# Patient Record
Sex: MALE | Race: WHITE | NOT HISPANIC OR LATINO | Employment: FULL TIME | URBAN - METROPOLITAN AREA
[De-identification: names, ages, dates, MRNs, and addresses within clinical notes are randomized per-mention and may not be internally consistent; named-entity substitution may affect disease eponyms.]

---

## 2017-01-03 ENCOUNTER — COMMUNICATION - HEALTHEAST (OUTPATIENT)
Dept: FAMILY MEDICINE | Facility: CLINIC | Age: 47
End: 2017-01-03

## 2017-01-03 DIAGNOSIS — K21.9 ESOPHAGEAL REFLUX: ICD-10-CM

## 2017-01-25 ENCOUNTER — COMMUNICATION - HEALTHEAST (OUTPATIENT)
Dept: FAMILY MEDICINE | Facility: CLINIC | Age: 47
End: 2017-01-25

## 2017-01-25 DIAGNOSIS — B00.9 HERPES SIMPLEX WITHOUT MENTION OF COMPLICATION: ICD-10-CM

## 2017-03-01 ENCOUNTER — COMMUNICATION - HEALTHEAST (OUTPATIENT)
Dept: FAMILY MEDICINE | Facility: CLINIC | Age: 47
End: 2017-03-01

## 2017-03-01 DIAGNOSIS — B00.9 HERPES SIMPLEX WITHOUT MENTION OF COMPLICATION: ICD-10-CM

## 2017-04-19 ENCOUNTER — OFFICE VISIT - HEALTHEAST (OUTPATIENT)
Dept: FAMILY MEDICINE | Facility: CLINIC | Age: 47
End: 2017-04-19

## 2017-04-19 DIAGNOSIS — B00.9 HERPES SIMPLEX WITHOUT MENTION OF COMPLICATION: ICD-10-CM

## 2017-04-19 DIAGNOSIS — Z00.00 ROUTINE GENERAL MEDICAL EXAMINATION AT A HEALTH CARE FACILITY: ICD-10-CM

## 2017-04-19 DIAGNOSIS — K21.9 GASTROESOPHAGEAL REFLUX DISEASE WITHOUT ESOPHAGITIS: ICD-10-CM

## 2017-04-19 LAB
CHOLEST SERPL-MCNC: 269 MG/DL
FASTING STATUS PATIENT QL REPORTED: YES
HDLC SERPL-MCNC: 49 MG/DL
LDLC SERPL CALC-MCNC: 193 MG/DL
TRIGL SERPL-MCNC: 137 MG/DL

## 2017-04-19 ASSESSMENT — MIFFLIN-ST. JEOR: SCORE: 1930.13

## 2017-04-21 ENCOUNTER — COMMUNICATION - HEALTHEAST (OUTPATIENT)
Dept: FAMILY MEDICINE | Facility: CLINIC | Age: 47
End: 2017-04-21

## 2017-06-14 ENCOUNTER — COMMUNICATION - HEALTHEAST (OUTPATIENT)
Dept: FAMILY MEDICINE | Facility: CLINIC | Age: 47
End: 2017-06-14

## 2017-06-14 DIAGNOSIS — K21.9 ESOPHAGEAL REFLUX: ICD-10-CM

## 2018-01-22 ENCOUNTER — COMMUNICATION - HEALTHEAST (OUTPATIENT)
Dept: FAMILY MEDICINE | Facility: CLINIC | Age: 48
End: 2018-01-22

## 2018-01-22 DIAGNOSIS — B00.9 HERPES SIMPLEX VIRUS (HSV) INFECTION: ICD-10-CM

## 2018-03-19 ENCOUNTER — COMMUNICATION - HEALTHEAST (OUTPATIENT)
Dept: FAMILY MEDICINE | Facility: CLINIC | Age: 48
End: 2018-03-19

## 2018-03-19 DIAGNOSIS — K21.9 ESOPHAGEAL REFLUX: ICD-10-CM

## 2018-05-02 ENCOUNTER — OFFICE VISIT - HEALTHEAST (OUTPATIENT)
Dept: FAMILY MEDICINE | Facility: CLINIC | Age: 48
End: 2018-05-02

## 2018-05-02 DIAGNOSIS — B00.9 HERPES SIMPLEX VIRUS (HSV) INFECTION: ICD-10-CM

## 2018-05-02 DIAGNOSIS — Z00.00 ROUTINE PHYSICAL EXAMINATION: ICD-10-CM

## 2018-05-02 DIAGNOSIS — K21.9 GASTROESOPHAGEAL REFLUX DISEASE WITHOUT ESOPHAGITIS: ICD-10-CM

## 2018-05-02 LAB
ALBUMIN SERPL-MCNC: 4.1 G/DL (ref 3.5–5)
ALP SERPL-CCNC: 90 U/L (ref 45–120)
ALT SERPL W P-5'-P-CCNC: 30 U/L (ref 0–45)
ANION GAP SERPL CALCULATED.3IONS-SCNC: 12 MMOL/L (ref 5–18)
AST SERPL W P-5'-P-CCNC: 17 U/L (ref 0–40)
BILIRUB SERPL-MCNC: 1 MG/DL (ref 0–1)
BUN SERPL-MCNC: 15 MG/DL (ref 8–22)
CALCIUM SERPL-MCNC: 9.7 MG/DL (ref 8.5–10.5)
CHLORIDE BLD-SCNC: 102 MMOL/L (ref 98–107)
CHOLEST SERPL-MCNC: 258 MG/DL
CO2 SERPL-SCNC: 26 MMOL/L (ref 22–31)
CREAT SERPL-MCNC: 0.97 MG/DL (ref 0.7–1.3)
FASTING STATUS PATIENT QL REPORTED: YES
GFR SERPL CREATININE-BSD FRML MDRD: >60 ML/MIN/1.73M2
GLUCOSE BLD-MCNC: 85 MG/DL (ref 70–125)
HDLC SERPL-MCNC: 45 MG/DL
LDLC SERPL CALC-MCNC: 146 MG/DL
POTASSIUM BLD-SCNC: 4.6 MMOL/L (ref 3.5–5)
PROT SERPL-MCNC: 7.9 G/DL (ref 6–8)
SODIUM SERPL-SCNC: 140 MMOL/L (ref 136–145)
TRIGL SERPL-MCNC: 336 MG/DL

## 2018-05-02 ASSESSMENT — MIFFLIN-ST. JEOR: SCORE: 1941.69

## 2018-06-08 ENCOUNTER — COMMUNICATION - HEALTHEAST (OUTPATIENT)
Dept: FAMILY MEDICINE | Facility: CLINIC | Age: 48
End: 2018-06-08

## 2018-06-08 DIAGNOSIS — K21.9 ESOPHAGEAL REFLUX: ICD-10-CM

## 2018-06-26 ENCOUNTER — COMMUNICATION - HEALTHEAST (OUTPATIENT)
Dept: FAMILY MEDICINE | Facility: CLINIC | Age: 48
End: 2018-06-26

## 2018-06-26 DIAGNOSIS — B00.9 HERPES SIMPLEX VIRUS (HSV) INFECTION: ICD-10-CM

## 2018-12-26 ENCOUNTER — OFFICE VISIT - HEALTHEAST (OUTPATIENT)
Dept: FAMILY MEDICINE | Facility: CLINIC | Age: 48
End: 2018-12-26

## 2018-12-26 DIAGNOSIS — J02.9 SORE THROAT: ICD-10-CM

## 2018-12-26 DIAGNOSIS — J02.0 ACUTE STREPTOCOCCAL PHARYNGITIS: ICD-10-CM

## 2018-12-26 LAB — DEPRECATED S PYO AG THROAT QL EIA: ABNORMAL

## 2019-01-02 ENCOUNTER — OFFICE VISIT - HEALTHEAST (OUTPATIENT)
Dept: FAMILY MEDICINE | Facility: CLINIC | Age: 49
End: 2019-01-02

## 2019-01-02 DIAGNOSIS — K21.9 GASTROESOPHAGEAL REFLUX DISEASE WITHOUT ESOPHAGITIS: ICD-10-CM

## 2019-01-02 DIAGNOSIS — E66.3 OVERWEIGHT: ICD-10-CM

## 2019-01-02 ASSESSMENT — MIFFLIN-ST. JEOR: SCORE: 1970.76

## 2019-01-09 ENCOUNTER — COMMUNICATION - HEALTHEAST (OUTPATIENT)
Dept: FAMILY MEDICINE | Facility: CLINIC | Age: 49
End: 2019-01-09

## 2019-01-09 DIAGNOSIS — Z12.11 SCREEN FOR COLON CANCER: ICD-10-CM

## 2019-01-23 ENCOUNTER — COMMUNICATION - HEALTHEAST (OUTPATIENT)
Dept: FAMILY MEDICINE | Facility: CLINIC | Age: 49
End: 2019-01-23

## 2019-03-27 ENCOUNTER — OFFICE VISIT - HEALTHEAST (OUTPATIENT)
Dept: FAMILY MEDICINE | Facility: CLINIC | Age: 49
End: 2019-03-27

## 2019-03-27 DIAGNOSIS — R97.20 ELEVATED PROSTATE SPECIFIC ANTIGEN (PSA): ICD-10-CM

## 2019-03-27 DIAGNOSIS — Z11.3 ROUTINE SCREENING FOR STI (SEXUALLY TRANSMITTED INFECTION): ICD-10-CM

## 2019-03-27 DIAGNOSIS — R03.0 ELEVATED BLOOD PRESSURE READING WITHOUT DIAGNOSIS OF HYPERTENSION: ICD-10-CM

## 2019-03-27 LAB
ALBUMIN SERPL-MCNC: 3.8 G/DL (ref 3.5–5)
ALBUMIN UR-MCNC: ABNORMAL MG/DL
ALP SERPL-CCNC: 79 U/L (ref 45–120)
ALT SERPL W P-5'-P-CCNC: 17 U/L (ref 0–45)
ANION GAP SERPL CALCULATED.3IONS-SCNC: 9 MMOL/L (ref 5–18)
APPEARANCE UR: CLEAR
AST SERPL W P-5'-P-CCNC: 14 U/L (ref 0–40)
BACTERIA #/AREA URNS HPF: ABNORMAL HPF
BASOPHILS # BLD AUTO: 0.1 THOU/UL (ref 0–0.2)
BASOPHILS NFR BLD AUTO: 1 % (ref 0–2)
BILIRUB SERPL-MCNC: 0.6 MG/DL (ref 0–1)
BILIRUB UR QL STRIP: NEGATIVE
BUN SERPL-MCNC: 14 MG/DL (ref 8–22)
CALCIUM SERPL-MCNC: 9.5 MG/DL (ref 8.5–10.5)
CHLORIDE BLD-SCNC: 106 MMOL/L (ref 98–107)
CHOLEST SERPL-MCNC: 197 MG/DL
CO2 SERPL-SCNC: 26 MMOL/L (ref 22–31)
COLOR UR AUTO: YELLOW
CREAT SERPL-MCNC: 0.97 MG/DL (ref 0.7–1.3)
EOSINOPHIL # BLD AUTO: 0.2 THOU/UL (ref 0–0.4)
EOSINOPHIL NFR BLD AUTO: 2 % (ref 0–6)
ERYTHROCYTE [DISTWIDTH] IN BLOOD BY AUTOMATED COUNT: 12.2 % (ref 11–14.5)
FASTING STATUS PATIENT QL REPORTED: YES
GFR SERPL CREATININE-BSD FRML MDRD: >60 ML/MIN/1.73M2
GLUCOSE BLD-MCNC: 96 MG/DL (ref 70–125)
GLUCOSE UR STRIP-MCNC: NEGATIVE MG/DL
HCT VFR BLD AUTO: 41.1 % (ref 40–54)
HDLC SERPL-MCNC: 47 MG/DL
HGB BLD-MCNC: 13.8 G/DL (ref 14–18)
HGB UR QL STRIP: NEGATIVE
HIV 1+2 AB+HIV1 P24 AG SERPL QL IA: NEGATIVE
KETONES UR STRIP-MCNC: NEGATIVE MG/DL
LDLC SERPL CALC-MCNC: 125 MG/DL
LEUKOCYTE ESTERASE UR QL STRIP: ABNORMAL
LYMPHOCYTES # BLD AUTO: 1.6 THOU/UL (ref 0.8–4.4)
LYMPHOCYTES NFR BLD AUTO: 17 % (ref 20–40)
MCH RBC QN AUTO: 29 PG (ref 27–34)
MCHC RBC AUTO-ENTMCNC: 33.6 G/DL (ref 32–36)
MCV RBC AUTO: 86 FL (ref 80–100)
MONOCYTES # BLD AUTO: 0.7 THOU/UL (ref 0–0.9)
MONOCYTES NFR BLD AUTO: 8 % (ref 2–10)
NEUTROPHILS # BLD AUTO: 6.9 THOU/UL (ref 2–7.7)
NEUTROPHILS NFR BLD AUTO: 73 % (ref 50–70)
NITRATE UR QL: NEGATIVE
PH UR STRIP: 6 [PH] (ref 5–8)
PLATELET # BLD AUTO: 263 THOU/UL (ref 140–440)
PMV BLD AUTO: 7.7 FL (ref 7–10)
POTASSIUM BLD-SCNC: 4.5 MMOL/L (ref 3.5–5)
PROT SERPL-MCNC: 7.2 G/DL (ref 6–8)
PSA SERPL-MCNC: 28.8 NG/ML (ref 0–3.5)
RBC # BLD AUTO: 4.77 MILL/UL (ref 4.4–6.2)
RBC #/AREA URNS AUTO: ABNORMAL HPF
SODIUM SERPL-SCNC: 141 MMOL/L (ref 136–145)
SP GR UR STRIP: 1.02 (ref 1–1.03)
SQUAMOUS #/AREA URNS AUTO: ABNORMAL LPF
TRIGL SERPL-MCNC: 124 MG/DL
UROBILINOGEN UR STRIP-ACNC: ABNORMAL
WBC #/AREA URNS AUTO: ABNORMAL HPF
WBC: 9.5 THOU/UL (ref 4–11)

## 2019-03-28 ENCOUNTER — COMMUNICATION - HEALTHEAST (OUTPATIENT)
Dept: FAMILY MEDICINE | Facility: CLINIC | Age: 49
End: 2019-03-28

## 2019-03-28 LAB
BACTERIA SPEC CULT: NO GROWTH
C TRACH DNA SPEC QL PROBE+SIG AMP: NEGATIVE
N GONORRHOEA DNA SPEC QL NAA+PROBE: NEGATIVE
T PALLIDUM AB SER QL: NEGATIVE

## 2019-03-29 ENCOUNTER — AMBULATORY - HEALTHEAST (OUTPATIENT)
Dept: FAMILY MEDICINE | Facility: CLINIC | Age: 49
End: 2019-03-29

## 2019-03-29 LAB
HAV IGM SERPL QL IA: NEGATIVE
HBV CORE IGM SERPL QL IA: NEGATIVE
HBV SURFACE AG SERPL QL IA: NEGATIVE
HCV AB SERPL QL IA: NEGATIVE

## 2019-04-01 ENCOUNTER — COMMUNICATION - HEALTHEAST (OUTPATIENT)
Dept: FAMILY MEDICINE | Facility: CLINIC | Age: 49
End: 2019-04-01

## 2019-04-08 ENCOUNTER — RECORDS - HEALTHEAST (OUTPATIENT)
Dept: ADMINISTRATIVE | Facility: OTHER | Age: 49
End: 2019-04-08

## 2019-04-19 ENCOUNTER — RECORDS - HEALTHEAST (OUTPATIENT)
Dept: ADMINISTRATIVE | Facility: OTHER | Age: 49
End: 2019-04-19

## 2019-05-28 ENCOUNTER — COMMUNICATION - HEALTHEAST (OUTPATIENT)
Dept: FAMILY MEDICINE | Facility: CLINIC | Age: 49
End: 2019-05-28

## 2019-05-28 DIAGNOSIS — K21.9 ESOPHAGEAL REFLUX: ICD-10-CM

## 2019-07-11 ENCOUNTER — RECORDS - HEALTHEAST (OUTPATIENT)
Dept: ADMINISTRATIVE | Facility: OTHER | Age: 49
End: 2019-07-11

## 2019-07-15 ENCOUNTER — OFFICE VISIT - HEALTHEAST (OUTPATIENT)
Dept: FAMILY MEDICINE | Facility: CLINIC | Age: 49
End: 2019-07-15

## 2019-07-15 DIAGNOSIS — K21.9 ESOPHAGEAL REFLUX: ICD-10-CM

## 2019-07-15 DIAGNOSIS — Z12.11 SPECIAL SCREENING FOR MALIGNANT NEOPLASMS, COLON: ICD-10-CM

## 2019-07-15 DIAGNOSIS — B00.9 HERPES SIMPLEX VIRUS (HSV) INFECTION: ICD-10-CM

## 2019-07-15 RX ORDER — VALACYCLOVIR HYDROCHLORIDE 1 G/1
TABLET, FILM COATED ORAL
Qty: 8 TABLET | Refills: 6 | Status: SHIPPED | OUTPATIENT
Start: 2019-07-15 | End: 2021-10-01

## 2019-07-15 ASSESSMENT — MIFFLIN-ST. JEOR: SCORE: 1944.61

## 2019-10-23 ENCOUNTER — AMBULATORY - HEALTHEAST (OUTPATIENT)
Dept: LAB | Facility: CLINIC | Age: 49
End: 2019-10-23

## 2019-10-23 LAB
ANION GAP SERPL CALCULATED.3IONS-SCNC: 8 MMOL/L (ref 5–18)
BUN SERPL-MCNC: 15 MG/DL (ref 8–22)
CALCIUM SERPL-MCNC: 9.5 MG/DL (ref 8.5–10.5)
CHLORIDE BLD-SCNC: 106 MMOL/L (ref 98–107)
CO2 SERPL-SCNC: 27 MMOL/L (ref 22–31)
CREAT SERPL-MCNC: 1.13 MG/DL (ref 0.7–1.3)
GFR SERPL CREATININE-BSD FRML MDRD: >60 ML/MIN/1.73M2
GLUCOSE BLD-MCNC: 90 MG/DL (ref 70–125)
POTASSIUM BLD-SCNC: 4.9 MMOL/L (ref 3.5–5)
SODIUM SERPL-SCNC: 141 MMOL/L (ref 136–145)

## 2019-10-24 ENCOUNTER — COMMUNICATION - HEALTHEAST (OUTPATIENT)
Dept: FAMILY MEDICINE | Facility: CLINIC | Age: 49
End: 2019-10-24

## 2019-11-08 ENCOUNTER — COMMUNICATION - HEALTHEAST (OUTPATIENT)
Dept: FAMILY MEDICINE | Facility: CLINIC | Age: 49
End: 2019-11-08

## 2019-11-11 ENCOUNTER — AMBULATORY - HEALTHEAST (OUTPATIENT)
Dept: NURSING | Facility: CLINIC | Age: 49
End: 2019-11-11

## 2019-11-11 DIAGNOSIS — Z23 NEED FOR SHINGLES VACCINE: ICD-10-CM

## 2019-11-18 ENCOUNTER — COMMUNICATION - HEALTHEAST (OUTPATIENT)
Dept: FAMILY MEDICINE | Facility: CLINIC | Age: 49
End: 2019-11-18

## 2019-11-18 DIAGNOSIS — K21.9 ESOPHAGEAL REFLUX: ICD-10-CM

## 2019-12-04 ENCOUNTER — COMMUNICATION - HEALTHEAST (OUTPATIENT)
Dept: FAMILY MEDICINE | Facility: CLINIC | Age: 49
End: 2019-12-04

## 2020-01-13 ENCOUNTER — AMBULATORY - HEALTHEAST (OUTPATIENT)
Dept: NURSING | Facility: CLINIC | Age: 50
End: 2020-01-13

## 2020-01-23 ENCOUNTER — COMMUNICATION - HEALTHEAST (OUTPATIENT)
Dept: LAB | Facility: CLINIC | Age: 50
End: 2020-01-23

## 2020-01-29 ENCOUNTER — AMBULATORY - HEALTHEAST (OUTPATIENT)
Dept: LAB | Facility: CLINIC | Age: 50
End: 2020-01-29

## 2020-01-29 LAB
ANION GAP SERPL CALCULATED.3IONS-SCNC: 9 MMOL/L (ref 5–18)
BUN SERPL-MCNC: 13 MG/DL (ref 8–22)
CALCIUM SERPL-MCNC: 9.4 MG/DL (ref 8.5–10.5)
CHLORIDE BLD-SCNC: 104 MMOL/L (ref 98–107)
CO2 SERPL-SCNC: 27 MMOL/L (ref 22–31)
CREAT SERPL-MCNC: 1.06 MG/DL (ref 0.7–1.3)
GFR SERPL CREATININE-BSD FRML MDRD: >60 ML/MIN/1.73M2
GLUCOSE BLD-MCNC: 99 MG/DL (ref 70–125)
POTASSIUM BLD-SCNC: 5 MMOL/L (ref 3.5–5)
SODIUM SERPL-SCNC: 140 MMOL/L (ref 136–145)

## 2020-01-30 ENCOUNTER — OFFICE VISIT - HEALTHEAST (OUTPATIENT)
Dept: FAMILY MEDICINE | Facility: CLINIC | Age: 50
End: 2020-01-30

## 2020-01-30 DIAGNOSIS — R03.0 ELEVATED BP WITHOUT DIAGNOSIS OF HYPERTENSION: ICD-10-CM

## 2020-01-30 DIAGNOSIS — R36.9 URETHRAL DISCHARGE: ICD-10-CM

## 2020-01-31 ENCOUNTER — COMMUNICATION - HEALTHEAST (OUTPATIENT)
Dept: FAMILY MEDICINE | Facility: CLINIC | Age: 50
End: 2020-01-31

## 2020-01-31 LAB
C TRACH DNA SPEC QL PROBE+SIG AMP: NEGATIVE
N GONORRHOEA DNA SPEC QL NAA+PROBE: POSITIVE

## 2020-02-03 ENCOUNTER — RECORDS - HEALTHEAST (OUTPATIENT)
Dept: ADMINISTRATIVE | Facility: OTHER | Age: 50
End: 2020-02-03

## 2020-02-06 ENCOUNTER — AMBULATORY - HEALTHEAST (OUTPATIENT)
Dept: NURSING | Facility: CLINIC | Age: 50
End: 2020-02-06

## 2020-02-06 DIAGNOSIS — I10 ESSENTIAL HYPERTENSION, BENIGN: ICD-10-CM

## 2020-02-17 ENCOUNTER — RECORDS - HEALTHEAST (OUTPATIENT)
Dept: ADMINISTRATIVE | Facility: OTHER | Age: 50
End: 2020-02-17

## 2020-03-03 ENCOUNTER — OFFICE VISIT - HEALTHEAST (OUTPATIENT)
Dept: FAMILY MEDICINE | Facility: CLINIC | Age: 50
End: 2020-03-03

## 2020-03-03 DIAGNOSIS — Z11.3 SCREEN FOR STD (SEXUALLY TRANSMITTED DISEASE): ICD-10-CM

## 2020-03-03 DIAGNOSIS — Z79.899 ON PRE-EXPOSURE PROPHYLAXIS FOR HIV: ICD-10-CM

## 2020-03-03 DIAGNOSIS — I10 BENIGN ESSENTIAL HYPERTENSION: ICD-10-CM

## 2020-03-03 DIAGNOSIS — Z00.00 ANNUAL PHYSICAL EXAM: ICD-10-CM

## 2020-03-03 LAB
ALBUMIN SERPL-MCNC: 4 G/DL (ref 3.5–5)
ALP SERPL-CCNC: 103 U/L (ref 45–120)
ALT SERPL W P-5'-P-CCNC: 31 U/L (ref 0–45)
ANION GAP SERPL CALCULATED.3IONS-SCNC: 12 MMOL/L (ref 5–18)
AST SERPL W P-5'-P-CCNC: 20 U/L (ref 0–40)
BILIRUB SERPL-MCNC: 0.4 MG/DL (ref 0–1)
BUN SERPL-MCNC: 19 MG/DL (ref 8–22)
CALCIUM SERPL-MCNC: 9.9 MG/DL (ref 8.5–10.5)
CHLORIDE BLD-SCNC: 105 MMOL/L (ref 98–107)
CHOLEST SERPL-MCNC: 201 MG/DL
CO2 SERPL-SCNC: 24 MMOL/L (ref 22–31)
CREAT SERPL-MCNC: 1.01 MG/DL (ref 0.7–1.3)
FASTING STATUS PATIENT QL REPORTED: YES
GFR SERPL CREATININE-BSD FRML MDRD: >60 ML/MIN/1.73M2
GLUCOSE BLD-MCNC: 102 MG/DL (ref 70–125)
HDLC SERPL-MCNC: 41 MG/DL
HIV 1+2 AB+HIV1 P24 AG SERPL QL IA: NEGATIVE
LDLC SERPL CALC-MCNC: 115 MG/DL
POTASSIUM BLD-SCNC: 4.6 MMOL/L (ref 3.5–5)
PROT SERPL-MCNC: 7.2 G/DL (ref 6–8)
PSA SERPL-MCNC: 1.6 NG/ML (ref 0–3.5)
SODIUM SERPL-SCNC: 141 MMOL/L (ref 136–145)
TRIGL SERPL-MCNC: 227 MG/DL

## 2020-03-03 ASSESSMENT — MIFFLIN-ST. JEOR: SCORE: 1954.18

## 2020-03-04 ENCOUNTER — COMMUNICATION - HEALTHEAST (OUTPATIENT)
Dept: LAB | Facility: CLINIC | Age: 50
End: 2020-03-04

## 2020-03-04 LAB
C TRACH DNA SPEC QL PROBE+SIG AMP: NEGATIVE
HCV AB SERPL QL IA: NEGATIVE
N GONORRHOEA DNA SPEC QL NAA+PROBE: NEGATIVE
T PALLIDUM AB SER QL: NEGATIVE

## 2020-03-18 ENCOUNTER — AMBULATORY - HEALTHEAST (OUTPATIENT)
Dept: LAB | Facility: CLINIC | Age: 50
End: 2020-03-18

## 2020-03-18 DIAGNOSIS — I10 BENIGN ESSENTIAL HYPERTENSION: ICD-10-CM

## 2020-03-18 LAB
ANION GAP SERPL CALCULATED.3IONS-SCNC: 10 MMOL/L (ref 5–18)
BUN SERPL-MCNC: 14 MG/DL (ref 8–22)
CALCIUM SERPL-MCNC: 9.2 MG/DL (ref 8.5–10.5)
CHLORIDE BLD-SCNC: 107 MMOL/L (ref 98–107)
CO2 SERPL-SCNC: 26 MMOL/L (ref 22–31)
CREAT SERPL-MCNC: 1.04 MG/DL (ref 0.7–1.3)
GFR SERPL CREATININE-BSD FRML MDRD: >60 ML/MIN/1.73M2
GLUCOSE BLD-MCNC: 91 MG/DL (ref 70–125)
POTASSIUM BLD-SCNC: 4.2 MMOL/L (ref 3.5–5)
SODIUM SERPL-SCNC: 143 MMOL/L (ref 136–145)

## 2020-03-19 ENCOUNTER — COMMUNICATION - HEALTHEAST (OUTPATIENT)
Dept: FAMILY MEDICINE | Facility: CLINIC | Age: 50
End: 2020-03-19

## 2020-03-30 ENCOUNTER — AMBULATORY - HEALTHEAST (OUTPATIENT)
Dept: FAMILY MEDICINE | Facility: CLINIC | Age: 50
End: 2020-03-30

## 2020-03-30 DIAGNOSIS — I10 BENIGN ESSENTIAL HYPERTENSION: ICD-10-CM

## 2020-03-30 RX ORDER — BLOOD PRESSURE TEST KIT
1 KIT MISCELLANEOUS DAILY PRN
Qty: 1 EACH | Refills: 0 | Status: SHIPPED | OUTPATIENT
Start: 2020-03-30 | End: 2021-08-03

## 2020-04-28 ENCOUNTER — COMMUNICATION - HEALTHEAST (OUTPATIENT)
Dept: FAMILY MEDICINE | Facility: CLINIC | Age: 50
End: 2020-04-28

## 2020-06-22 ENCOUNTER — COMMUNICATION - HEALTHEAST (OUTPATIENT)
Dept: FAMILY MEDICINE | Facility: CLINIC | Age: 50
End: 2020-06-22

## 2020-06-23 RX ORDER — EMTRICITABINE AND TENOFOVIR DISOPROXIL FUMARATE 200; 300 MG/1; MG/1
1 TABLET, FILM COATED ORAL DAILY
Qty: 90 TABLET | Refills: 3 | Status: SHIPPED | OUTPATIENT
Start: 2020-06-23 | End: 2021-06-15

## 2020-07-27 ENCOUNTER — COMMUNICATION - HEALTHEAST (OUTPATIENT)
Dept: FAMILY MEDICINE | Facility: CLINIC | Age: 50
End: 2020-07-27

## 2020-07-27 DIAGNOSIS — Z79.899 ON PRE-EXPOSURE PROPHYLAXIS FOR HIV: ICD-10-CM

## 2020-07-28 ENCOUNTER — COMMUNICATION - HEALTHEAST (OUTPATIENT)
Dept: ADMINISTRATIVE | Facility: CLINIC | Age: 50
End: 2020-07-28

## 2020-08-03 ENCOUNTER — OFFICE VISIT - HEALTHEAST (OUTPATIENT)
Dept: INFECTIOUS DISEASES | Facility: CLINIC | Age: 50
End: 2020-08-03

## 2020-08-03 DIAGNOSIS — Z79.899 ON PRE-EXPOSURE PROPHYLAXIS FOR HIV: ICD-10-CM

## 2020-08-04 ENCOUNTER — AMBULATORY - HEALTHEAST (OUTPATIENT)
Dept: LAB | Facility: CLINIC | Age: 50
End: 2020-08-04

## 2020-08-04 DIAGNOSIS — Z79.899 ON PRE-EXPOSURE PROPHYLAXIS FOR HIV: ICD-10-CM

## 2020-08-04 LAB
ANION GAP SERPL CALCULATED.3IONS-SCNC: 10 MMOL/L (ref 5–18)
BUN SERPL-MCNC: 17 MG/DL (ref 8–22)
CALCIUM SERPL-MCNC: 9.4 MG/DL (ref 8.5–10.5)
CHLORIDE BLD-SCNC: 106 MMOL/L (ref 98–107)
CO2 SERPL-SCNC: 25 MMOL/L (ref 22–31)
CREAT SERPL-MCNC: 1.08 MG/DL (ref 0.7–1.3)
GFR SERPL CREATININE-BSD FRML MDRD: >60 ML/MIN/1.73M2
GLUCOSE BLD-MCNC: 82 MG/DL (ref 70–125)
HIV 1+2 AB+HIV1 P24 AG SERPL QL IA: NEGATIVE
POTASSIUM BLD-SCNC: 4.7 MMOL/L (ref 3.5–5)
SODIUM SERPL-SCNC: 141 MMOL/L (ref 136–145)

## 2020-08-17 ENCOUNTER — COMMUNICATION - HEALTHEAST (OUTPATIENT)
Dept: FAMILY MEDICINE | Facility: CLINIC | Age: 50
End: 2020-08-17

## 2020-08-17 DIAGNOSIS — K21.9 ESOPHAGEAL REFLUX: ICD-10-CM

## 2020-10-19 ENCOUNTER — AMBULATORY - HEALTHEAST (OUTPATIENT)
Dept: NURSING | Facility: CLINIC | Age: 50
End: 2020-10-19

## 2020-11-05 ENCOUNTER — AMBULATORY - HEALTHEAST (OUTPATIENT)
Dept: LAB | Facility: CLINIC | Age: 50
End: 2020-11-05

## 2020-11-05 DIAGNOSIS — Z79.899 ON PRE-EXPOSURE PROPHYLAXIS FOR HIV: ICD-10-CM

## 2020-11-05 LAB
ANION GAP SERPL CALCULATED.3IONS-SCNC: 10 MMOL/L (ref 5–18)
BUN SERPL-MCNC: 17 MG/DL (ref 8–22)
CALCIUM SERPL-MCNC: 9.4 MG/DL (ref 8.5–10.5)
CHLORIDE BLD-SCNC: 106 MMOL/L (ref 98–107)
CO2 SERPL-SCNC: 25 MMOL/L (ref 22–31)
CREAT SERPL-MCNC: 1.32 MG/DL (ref 0.7–1.3)
GFR SERPL CREATININE-BSD FRML MDRD: 57 ML/MIN/1.73M2
GLUCOSE BLD-MCNC: 89 MG/DL (ref 70–125)
HIV 1+2 AB+HIV1 P24 AG SERPL QL IA: NEGATIVE
POTASSIUM BLD-SCNC: 4.6 MMOL/L (ref 3.5–5)
SODIUM SERPL-SCNC: 141 MMOL/L (ref 136–145)

## 2020-11-09 ENCOUNTER — AMBULATORY - HEALTHEAST (OUTPATIENT)
Dept: INFECTIOUS DISEASES | Facility: CLINIC | Age: 50
End: 2020-11-09

## 2020-11-09 DIAGNOSIS — Z79.899 ON PRE-EXPOSURE PROPHYLAXIS FOR HIV: ICD-10-CM

## 2020-12-01 ENCOUNTER — COMMUNICATION - HEALTHEAST (OUTPATIENT)
Dept: INFECTIOUS DISEASES | Facility: CLINIC | Age: 50
End: 2020-12-01

## 2020-12-03 ENCOUNTER — AMBULATORY - HEALTHEAST (OUTPATIENT)
Dept: LAB | Facility: CLINIC | Age: 50
End: 2020-12-03

## 2020-12-03 DIAGNOSIS — Z79.899 ON PRE-EXPOSURE PROPHYLAXIS FOR HIV: ICD-10-CM

## 2020-12-03 LAB
ANION GAP SERPL CALCULATED.3IONS-SCNC: 10 MMOL/L (ref 5–18)
BUN SERPL-MCNC: 19 MG/DL (ref 8–22)
CALCIUM SERPL-MCNC: 9.4 MG/DL (ref 8.5–10.5)
CHLORIDE BLD-SCNC: 105 MMOL/L (ref 98–107)
CO2 SERPL-SCNC: 26 MMOL/L (ref 22–31)
CREAT SERPL-MCNC: 1.01 MG/DL (ref 0.7–1.3)
GFR SERPL CREATININE-BSD FRML MDRD: >60 ML/MIN/1.73M2
GLUCOSE BLD-MCNC: 100 MG/DL (ref 70–125)
HIV 1+2 AB+HIV1 P24 AG SERPL QL IA: NEGATIVE
POTASSIUM BLD-SCNC: 5 MMOL/L (ref 3.5–5)
SODIUM SERPL-SCNC: 141 MMOL/L (ref 136–145)

## 2020-12-15 ENCOUNTER — COMMUNICATION - HEALTHEAST (OUTPATIENT)
Dept: FAMILY MEDICINE | Facility: CLINIC | Age: 50
End: 2020-12-15

## 2020-12-15 DIAGNOSIS — I10 BENIGN ESSENTIAL HYPERTENSION: ICD-10-CM

## 2021-03-03 ENCOUNTER — AMBULATORY - HEALTHEAST (OUTPATIENT)
Dept: LAB | Facility: CLINIC | Age: 51
End: 2021-03-03

## 2021-03-03 DIAGNOSIS — Z79.899 ON PRE-EXPOSURE PROPHYLAXIS FOR HIV: ICD-10-CM

## 2021-03-03 LAB
ANION GAP SERPL CALCULATED.3IONS-SCNC: 10 MMOL/L (ref 5–18)
BUN SERPL-MCNC: 13 MG/DL (ref 8–22)
CALCIUM SERPL-MCNC: 9 MG/DL (ref 8.5–10.5)
CHLORIDE BLD-SCNC: 106 MMOL/L (ref 98–107)
CO2 SERPL-SCNC: 26 MMOL/L (ref 22–31)
CREAT SERPL-MCNC: 1.09 MG/DL (ref 0.7–1.3)
GFR SERPL CREATININE-BSD FRML MDRD: >60 ML/MIN/1.73M2
GLUCOSE BLD-MCNC: 100 MG/DL (ref 70–125)
HIV 1+2 AB+HIV1 P24 AG SERPL QL IA: NEGATIVE
POTASSIUM BLD-SCNC: 5.2 MMOL/L (ref 3.5–5)
SODIUM SERPL-SCNC: 142 MMOL/L (ref 136–145)

## 2021-03-29 ENCOUNTER — COMMUNICATION - HEALTHEAST (OUTPATIENT)
Dept: FAMILY MEDICINE | Facility: CLINIC | Age: 51
End: 2021-03-29

## 2021-03-29 DIAGNOSIS — I10 BENIGN ESSENTIAL HYPERTENSION: ICD-10-CM

## 2021-03-29 RX ORDER — LISINOPRIL 10 MG/1
10 TABLET ORAL DAILY
Qty: 90 TABLET | Refills: 1 | Status: SHIPPED | OUTPATIENT
Start: 2021-03-29 | End: 2021-09-23

## 2021-04-24 ENCOUNTER — COMMUNICATION - HEALTHEAST (OUTPATIENT)
Dept: FAMILY MEDICINE | Facility: CLINIC | Age: 51
End: 2021-04-24

## 2021-05-11 ENCOUNTER — COMMUNICATION - HEALTHEAST (OUTPATIENT)
Dept: FAMILY MEDICINE | Facility: CLINIC | Age: 51
End: 2021-05-11

## 2021-05-11 DIAGNOSIS — K21.9 ESOPHAGEAL REFLUX: ICD-10-CM

## 2021-05-27 VITALS — HEART RATE: 61 BPM | SYSTOLIC BLOOD PRESSURE: 156 MMHG | DIASTOLIC BLOOD PRESSURE: 103 MMHG

## 2021-05-27 NOTE — PROGRESS NOTES
Assessment/Plan:        Diagnoses and all orders for this visit:    Routine screening for STI (sexually transmitted infection)  -     Chlamydia trachomatis & Neisseria gonorrhoeae, Amplified Detection  -     HIV Antigen/Antibody Diagnostic [place]  -     Syphilis Screen, [facility]  -     PSA, Annual Screen (Prostatic-Specific Antigen)    Elevated blood pressure reading without diagnosis of hypertension  -     Urinalysis-UC if Indicated  -     Comprehensive Metabolic Panel  -     HM1(CBC and Differential)  -     Hepatitis Acute Evaluation  -     HM1 (CBC with Diff)  -     Lipid [facility]  -     Culture, Urine   Will prescribe PrEP as long as all labs come back negative.   Home monitor of BP over the next 2 weeks, return to clinic with Blood pressure readings in 2 weeks.  Elevated prostate specific antigen (PSA)  -     Ambulatory referral to Urology  -     ciprofloxacin HCl (CIPRO) 500 MG tablet  Dispense: 28 tablet; Refill: 0   Prostate cancer vs prostatitis, will treat with cipro and refer him to urology.  Follow up as soon as possible to urology.        Subjective:    Patient ID: Jl Sky is a 49 y.o. male.    HPI Patient has complaints of urinary discharge and pain with urination for past couple of days. Over the weekend he had unprotected sex with another man. He states he has been  for a couple of years from his partner Norris who was HIV positive. In his  life; he states that their sexual life didn't include anal sex and they always used a condom. He states he is not usually someone who has frequent intercourse with other men however he still wants to consider the PrEP prophylaxis against HIV as a possibility for him to protect himself. Until this week he has felt fine. He would like to have the screening done for STIs.     Blood pressure is slightly elevated today. No previous history of elevated BP. Denies chest pain, headaches, dizziness, leg swelling, heart palpitations.    The following  portions of the patient's history were reviewed and updated as appropriate: allergies, current medications, past family history, past medical history, past social history and problem list.    Review of Systems   Constitutional: Negative.    HENT: Negative.    Eyes: Negative.    Respiratory: Negative.    Cardiovascular: Negative.    Gastrointestinal: Negative.    Musculoskeletal: Negative.    Skin: Negative.    Neurological: Negative for dizziness, light-headedness, numbness and headaches.   Psychiatric/Behavioral: Negative.              Objective:    Physical Exam  BP (!) 141/107 (Patient Site: Left Arm, Patient Position: Sitting, Cuff Size: Adult Regular)   Pulse 63   Resp 18   Wt (!) 231 lb (104.8 kg)   BMI 30.29 kg/m    General Appearance:  Alert, cooperative, no distress, appears stated age   Head:  Normocephalic, without obvious abnormality, atraumatic   Eyes:  PERRL, conjunctiva/corneas clear, EOM's intact   Ears:  Normal TM's and external ear canals, both ears   Nose: Nares normal, septum midline, mucosa normal, no drainage   Throat: Lips, mucosa, and tongue normal; teeth and gums normal   Neck: Supple, symmetrical, trachea midline, no adenopathy, thyroid: not enlarged, symmetric, no tenderness/mass/nodules   Back:   Symmetric, no curvature, ROM normal, no CVA tenderness   Lungs:   Clear to auscultation bilaterally, respirations unlabored   Chest Wall:  No tenderness or deformity   Heart:  Regular rate and rhythm, S1, S2 normal, no murmur, rub or gallop   Abdomen:   Soft, non-tender, bowel sounds active all four quadrants,  no masses, no organomegaly   Extremities: Extremities normal, atraumatic, no cyanosis or edema   Lymph nodes: Cervical normal   Neurologic: Normal,Coordinated, smooth gait, balance, rapid alternating movements, sensory functioning, and cranial nerves II-XII grossly intact. DTR's+2 bilaterally brachioradialis, knee, ankle.

## 2021-05-27 NOTE — TELEPHONE ENCOUNTER
Patient Returning Call  Reason for call:  Patient returning call.  Information relayed to patient:  Below message relayed to patient.   Patient has additional questions:  No  If YES, what are your questions/concerns:  No additional questions at this time.  Okay to leave a detailed message?: No call back needed

## 2021-05-27 NOTE — TELEPHONE ENCOUNTER
----- Message from Ashli Oconnell CNP sent at 3/28/2019  6:16 AM CDT -----  Please call patient and let him know his PSA was really elevated, he needs to follow up with urology for further evaluation. In case it is prostatitis I prescribed Ciprofloxacin twice a day in case of.

## 2021-05-27 NOTE — TELEPHONE ENCOUNTER
I will prescribe it as soon as I get the rest of his labs resulted.   Ashli    Routing Comment        Left message for patient to call back.

## 2021-05-27 NOTE — TELEPHONE ENCOUNTER
Who is calling:  Patient .  Reason for Call:  Patient states this medication is too expensive at this time, but wants to keep it on file to when he is able to get it.    emtricitabine-tenofovir, TDF, (TRUVADA) 200-300 mg per tablet 90 tablet 0 3/29/2019     Sig - Route: Take 1 tablet by mouth daily. - Oral        Date of last appointment with primary care: 3/27/2019  Okay to leave a detailed message: Yes, if needed.

## 2021-05-27 NOTE — TELEPHONE ENCOUNTER
Patient informed and he already picked up his rx.    He was also asking about the rx for PrEP.  He was under the assumption that you were going to send that also.  Please send a 3 mo supply.

## 2021-05-27 NOTE — PATIENT INSTRUCTIONS - HE
Check BP at home 5-6 Blood pressure readings 140/90    Low carb high fat diet   -Whole real foods. Avoid processed foods especially foods high in processed carbohydrates and sugars.   - 50-70 grams of carbs in a day,   -4 palm sized protein in a day,   -4 or more servings of veggies/day. Avoid starchy vegetables.    - Minimize sugars and fruits. (except berries; strawberries, blueberries, blackberries, raspberries).  -Good healthy fats; avocados, whole milk, cheese, full fat yogurt, nuts, natural nut butters, cream cheese, heavy cream.   -eat protein and healthy fats meals for breakfast and lunch; avoid carbohydrates for these meals.     Diet doctor.[email or web address] as a online resource for eating a low carbohydrate high fat diet.  Realfoodsrds.com    The Case Against Sugar by Tino Segal  Why we Get Fat by Tino Segal  The Obesity Code by Dr Mo Durbin (YouTube, book, podcast)  Fat Chance by Dr Howard Perez (book)  Sugar the Bitter Truth (youtube)

## 2021-05-29 NOTE — TELEPHONE ENCOUNTER
Former patient of Marilee & has not established care with another provider.  Please assign refill request to covering provider per Clinic standard process.      Refill Approved    Rx renewed per Medication Renewal Policy. Medication was last renewed on 6/10/18.    Shyann Quintanilla, [facility] Triage/Med Refill 5/28/2019     Requested Prescriptions   Pending Prescriptions Disp Refills     omeprazole (PRILOSEC) 20 MG capsule 90 capsule 3     Sig: Take 1 capsule (20 mg total) by mouth daily.       GI Medications Refill Protocol Passed - 5/28/2019  5:34 PM        Passed - PCP or prescribing provider visit in last 12 or next 3 months.     Last office visit with prescriber/PCP: 1/2/2019 Jagruti Hunt MD OR same dept: 3/27/2019 Ashli Oconnell CNP OR same specialty: 3/27/2019 Ashli Oconnell CNP  Last physical: 5/2/2018 Last MTM visit: Visit date not found   Next visit within 3 mo: Visit date not found  Next physical within 3 mo: Visit date not found  Prescriber OR PCP: Jagruti Hunt MD  Last diagnosis associated with med order: 1. Esophageal reflux  - omeprazole (PRILOSEC) 20 MG capsule; Take 1 capsule (20 mg total) by mouth daily.  Dispense: 90 capsule; Refill: 3    If protocol passes may refill for 12 months if within 3 months of last provider visit (or a total of 15 months).

## 2021-05-30 VITALS — WEIGHT: 225.38 LBS | BODY MASS INDEX: 29.87 KG/M2 | HEIGHT: 73 IN

## 2021-05-30 NOTE — PROGRESS NOTES
Assessment:     1. Special screening for malignant neoplasms, colon  Ambulatory referral for Colonoscopy   2. Herpes simplex virus (HSV) infection  valACYclovir (VALTREX) 1000 MG tablet   3. Esophageal reflux  omeprazole (PRILOSEC) 20 MG capsule   4. At risk for HIV due to homosexual contact  emtricitabine-tenofovir, TDF, (TRUVADA) 200-300 mg per tablet    Basic Metabolic Panel     Medication refill done.  For LA EP for HIV prophylaxis.  For future we will discuss possible anal HPV screening though I discussed with the patient I have not done this before and I may also refer him to a specialist.  He will do his own research and let me know if he is interested in pursuing.    Continue to monitor diarrhea but if persisting then he should follow-up for further work-up.    Plan:      The diagnosis was discussed with the patient and evaluation and treatment plans outlined.  Follow up: Return in about 8 months (around 3/15/2020) for Annual physical.     Subjective:      Jl Sky is a  male who presents for evaluation of   Chief Complaint   Patient presents with     Establish Care     Pt here today to establish care with a new provider- former Dr Hunt Pt     1- Diarrhea: This has been present for about a week.  He describes stools as being mushy.  No watery diarrhea.  They are getting somewhat better.    2- High BP: This was noted in the last visit.  Recheck today is normal.  No shortness of breath or chest pain    He would like to discuss LA EP for HIV prophylaxis as he is in same-sex relationship.  Currently does not have a partner.      The following portions of the patient's history were reviewed and updated as appropriate: allergies, current medications, past family history, past medical history, past social history, past surgical history and problem list.  No Known Allergies    Current Outpatient Medications on File Prior to Visit   Medication Sig Dispense Refill     loratadine (CLARITIN) 10 mg tablet  Take 10 mg by mouth daily.       No current facility-administered medications on file prior to visit.        Patient Active Problem List   Diagnosis     Overweight     Hemorrhoids     Herpes Simplex Type I     Esophageal Reflux     Allergic Rhinitis     Wheezing (Symptom)     Snoring (Symptom)       History reviewed. No pertinent past medical history.    History reviewed. No pertinent surgical history.    Family History   Problem Relation Age of Onset     Hypertension Mother      Pulmonary embolism Father      Diabetes type II Father         500 lbs       Social History     Socioeconomic History     Marital status: Single     Spouse name: None     Number of children: None     Years of education: None     Highest education level: None   Occupational History     None   Social Needs     Financial resource strain: None     Food insecurity:     Worry: None     Inability: None     Transportation needs:     Medical: None     Non-medical: None   Tobacco Use     Smoking status: Never Smoker     Smokeless tobacco: Never Used   Substance and Sexual Activity     Alcohol use: None     Drug use: None     Sexual activity: None   Lifestyle     Physical activity:     Days per week: None     Minutes per session: None     Stress: None   Relationships     Social connections:     Talks on phone: None     Gets together: None     Attends Latter day service: None     Active member of club or organization: None     Attends meetings of clubs or organizations: None     Relationship status: None     Intimate partner violence:     Fear of current or ex partner: None     Emotionally abused: None     Physically abused: None     Forced sexual activity: None   Other Topics Concern     None   Social History Narrative    Patient is single.  He is in same-sex relationship but currently not partnered.  He is a  and teaches choir and orchestra.        Samm Iraheta MD  7/17/2019                The 10-year ASCVD risk score (Luly PEREZ Jr.,  "et al., 2013) is: 3.9%      Values used to calculate the score:        Age: 49 years        Sex: Male        Is Non- : No        Diabetic: No        Tobacco smoker: No        Systolic Blood Pressure: 134 mmHg        Is BP treated: No        HDL Cholesterol: 47 mg/dL        Total Cholesterol: 197 mg/dL     Review of Systems  A 12 point comprehensive review of systems was negative except as noted.       Objective:   /90   Pulse 61   Resp 16   Ht 6' 1.58\" (1.869 m)   Wt (!) 227 lb 6.4 oz (103.1 kg)   SpO2 98%   BMI 29.53 kg/m      GENERAL APPEARANCE:  Appearing stated age, smiling, alert, cooperative, and in no acute distress.   HEENT: Pupils equal, regular, react to light and accommodation. Extraocular muscles intact, fundi benign. Ear canals and tympanic membranes are normal. Lips, mouth, and throat are unremarkable.   NECK: Neck supple without adenopathy, thyromegaly or masses.   LYMPH: No anterior cervical or supraclavicular LN enlargement   PULMONARY: Normal respiratory effort. Chest is clear.   CARDIOVASCULAR: Heart auscultation: rhythm regular, heart sounds normal S1 and S2, bruit carotid artery absent.   SKIN: Warm and well perfused..   ABDOMEN: Abdomen soft, non-tender. BS normal. No masses or organomegaly.   EXTREMITIES: Peripheral pulses are full. Extremities with no edema.   MENTAL STATUS: Alert, oriented and thought content appropriate   NEUROLOGIC: Station and gait normal, strength and movement normal, reflexes are normal and symmetric       "

## 2021-06-01 VITALS — BODY MASS INDEX: 30.22 KG/M2 | WEIGHT: 228 LBS | HEIGHT: 73 IN

## 2021-06-02 VITALS — BODY MASS INDEX: 30.34 KG/M2 | WEIGHT: 231.4 LBS

## 2021-06-02 VITALS — HEIGHT: 73 IN | WEIGHT: 234.4 LBS | BODY MASS INDEX: 31.07 KG/M2

## 2021-06-02 VITALS — BODY MASS INDEX: 30.29 KG/M2 | WEIGHT: 231 LBS

## 2021-06-03 VITALS — BODY MASS INDEX: 29.18 KG/M2 | HEIGHT: 74 IN | WEIGHT: 227.4 LBS

## 2021-06-03 NOTE — TELEPHONE ENCOUNTER
Refill Approved    Rx renewed per Medication Renewal Policy. Medication was last renewed on 7/15/19.    Shyann Quintanilla, [facility] Triage/Med Refill 11/19/2019     Requested Prescriptions   Pending Prescriptions Disp Refills     omeprazole (PRILOSEC) 20 MG capsule 90 capsule 0     Sig: Take 1 capsule (20 mg total) by mouth daily.       GI Medications Refill Protocol Passed - 11/18/2019 10:45 PM        Passed - PCP or prescribing provider visit in last 12 or next 3 months.     Last office visit with prescriber/PCP: 7/15/2019 Samm Iraheta MD OR same dept: 7/15/2019 Samm Iraheta MD OR same specialty: 7/15/2019 Samm Iraheta MD  Last physical: Visit date not found Last MTM visit: Visit date not found   Next visit within 3 mo: Visit date not found  Next physical within 3 mo: Visit date not found  Prescriber OR PCP: Samm Iraheta MD  Last diagnosis associated with med order: 1. Esophageal reflux  - omeprazole (PRILOSEC) 20 MG capsule; Take 1 capsule (20 mg total) by mouth daily.  Dispense: 90 capsule; Refill: 0    If protocol passes may refill for 12 months if within 3 months of last provider visit (or a total of 15 months).

## 2021-06-04 VITALS
WEIGHT: 228 LBS | RESPIRATION RATE: 20 BRPM | BODY MASS INDEX: 29.61 KG/M2 | DIASTOLIC BLOOD PRESSURE: 108 MMHG | SYSTOLIC BLOOD PRESSURE: 142 MMHG | HEART RATE: 61 BPM | TEMPERATURE: 97.4 F

## 2021-06-04 VITALS
BODY MASS INDEX: 29.49 KG/M2 | OXYGEN SATURATION: 96 % | DIASTOLIC BLOOD PRESSURE: 109 MMHG | HEIGHT: 74 IN | WEIGHT: 229.8 LBS | HEART RATE: 65 BPM | SYSTOLIC BLOOD PRESSURE: 165 MMHG

## 2021-06-05 NOTE — PROGRESS NOTES
Follow Up Blood Pressure Check    Jl Sky is a 49 y.o. male recommended to follow up for blood pressure check by Samm Iraheta MD. Anihypertensive medications and adherence were verified: Yes.     Reason for visit: Elevated bp at last OV    Medication change at last visit: none    Today's Vitals:   Vitals:    02/06/20 0853 02/06/20 0902   BP: (!) 146/106 (!) 156/103   Patient Site: Left Arm Left Arm   Patient Position: Sitting Sitting   Cuff Size: Adult Large Adult Large   Pulse: 60 61           Lowest blood pressure today is 146/106 and they deny signs or symptoms of new onset: None.  Please inform patient of his/her blood pressure today.  If they are asymptomatic, the patient is to continue current medications.  This message will be routed to their provider, and they will be notified if a change in medication is recommended.         Elva Morales    Current Outpatient Medications   Medication Sig Dispense Refill     emtricitabine-tenofovir, TDF, (TRUVADA) 200-300 mg per tablet Take 1 tablet by mouth daily. 90 tablet 3     loratadine (CLARITIN) 10 mg tablet Take 10 mg by mouth daily.       omeprazole (PRILOSEC) 20 MG capsule Take 1 capsule (20 mg total) by mouth daily. 90 capsule 2     valACYclovir (VALTREX) 1000 MG tablet Take 2 tablets by mouth twice daily for 1 day as needed. 8 tablet 6     No current facility-administered medications for this visit.

## 2021-06-05 NOTE — TELEPHONE ENCOUNTER
Test for gonorrhea done on 1-29-20 is POSITIVE.  You ave been appropriately treated.  The [place] [facility] will be notified and they are responsible for tracing down contacts.

## 2021-06-05 NOTE — PATIENT INSTRUCTIONS - HE
GC and Chlamydia screen    Ceftriaxone 250 mg IM times one dose    Safe sex    Zithromax 1 gram orally (single dose)    Nurse BP check in one week    Avoid salt

## 2021-06-05 NOTE — TELEPHONE ENCOUNTER
From up-to-date: Recommendation for monitoring parameters for Truvada    HIV-1 preexposure prophylaxis (PrEP) (CDC 2011; CDC 2012): Pregnancy test for women receiving PrEP (every visit); documented negative HIV test (immediately prior to use, every 2-3 months, and following discontinuation of PrEP), assess risk behaviors and symptoms of sexually-transmitted infections (STIs) or acute HIV-1 infection and provide condoms (immediately prior to use, then every 2-3 months during therapy); BUN and serum creatinine (prior to initiation, 3 months after initiation, then every 6 months); urine glucose and urine protein (in patients at risk for renal impairment or who experienced renal impairment while taking adefovir); serum phosphorus in patients with chronic kidney disease, testing for HBV (prior to initiation) and STIs (prior to initiation, then at least every 6 months, even if asymptomatic)

## 2021-06-05 NOTE — TELEPHONE ENCOUNTER
Patient gets a creatinine checked due to the recent being on a medication called Frantz Iraheta MD  1/23/2020

## 2021-06-05 NOTE — PROGRESS NOTES
"DIAGNOSIS:  1. Urethral discharge, probable gonorrhea Chlamydia trachomatis & Neisseria gonorrhoeae, Amplified Detection    cefTRIAXone injection 250 mg (ROCEPHIN)    azithromycin (ZITHROMAX Z-JOSLYN) 250 MG tablet   2. Elevated BP without diagnosis of hypertension , probable untreated HTN        PLAN:     GC and Chlamydia screen    Ceftriaxone 250 mg IM times one dose    Zithromax 1 gram orally (single dose)    Nurse BP  Check in one week    Avoid salt    Explained that [place] [facility] would attempt to attempt to contact his partner for treatment if the tests done today confirmed an STD.    Discussed \"safe sex\"          HPI:   Thinks he has an STD.  Onset 2 days ago of a greenish penile drainage which has persisted.  Some green discharge in the shorts.  Not painful to void.  No fever or chills.     Normally uses condoms but had anal sex with a male 6 days ago.  He has been unable to contact the sexual partner since.  No known drug allergies..          Current Outpatient Medications on File Prior to Visit   Medication Sig Dispense Refill     emtricitabine-tenofovir, TDF, (TRUVADA) 200-300 mg per tablet Take 1 tablet by mouth daily. 90 tablet 3     omeprazole (PRILOSEC) 20 MG capsule Take 1 capsule (20 mg total) by mouth daily. 90 capsule 2     valACYclovir (VALTREX) 1000 MG tablet Take 2 tablets by mouth twice daily for 1 day as needed. 8 tablet 6     loratadine (CLARITIN) 10 mg tablet Take 10 mg by mouth daily.       No current facility-administered medications on file prior to visit.        Pmh: reviewed  Psh: reviewed  Allergy:  reviewed      EXAM:    BP (!) 142/108   Pulse 61   Temp 97.4  F (36.3  C) (Oral)   Resp 20   Wt (!) 228 lb (103.4 kg)   BMI 29.61 kg/m    GEN:   ALERT, NAD, ORIENTED TIMES THRww  LUNGS: CTA  COR: RRR WITHOUT MURMUR  SKIN:  NO GENITAL RASH OR ULCERS NOTED  :  THICK YELLOWISH GREEN MEATAL DISCHARGE.  EXT: WITHOUT EDEMA/SWELLING    Recent Results (from the past 168 hour(s))   Basic " Metabolic Panel    Collection Time: 01/29/20  7:49 AM   Result Value Ref Range    Sodium 140 136 - 145 mmol/L    Potassium 5.0 3.5 - 5.0 mmol/L    Chloride 104 98 - 107 mmol/L    CO2 27 22 - 31 mmol/L    Anion Gap, Calculation 9 5 - 18 mmol/L    Glucose 99 70 - 125 mg/dL    Calcium 9.4 8.5 - 10.5 mg/dL    BUN 13 8 - 22 mg/dL    Creatinine 1.06 0.70 - 1.30 mg/dL    GFR MDRD Af Amer >60 >60 mL/min/1.73m2    GFR MDRD Non Af Amer >60 >60 mL/min/1.73m2

## 2021-06-06 NOTE — PROGRESS NOTES
Assessment:     1. Annual physical exam  Comprehensive Metabolic Panel    Lipid [place]    PSA, Annual Screen (Prostatic-Specific Antigen)   2. Screen for STD (sexually transmitted disease)  HIV Antigen/Antibody Diagnostic [place]    Hepatitis C Antibody (Anti-HCV)    Chlamydia trachomatis & Neisseria gonorrhoeae, Amplified Detection    Syphilis Screen, [place]   3. Benign essential hypertension  lisinopriL (PRINIVIL,ZESTRIL) 10 MG tablet   4. On pre-exposure prophylaxis for HIV        Healthy male exam.    On HIV prophylaxis with Truvada that has been dispensed by me.  Will check labs as above.  He does remain high risk due to recent gonorrhea.  I discussed with him that he does need annual screening Pap smear/HPV.  I have no expertise in doing this in primary care.  I will see if ID will see him versus [place] [facility] and will refer him.  Emphasized the importance of this per guidelines.    Noting elevated blood pressure, discussed that he has high blood pressure/hypertension and we should start medication.  Patient is agreeable.  He will come for a creatinine check in about 2 weeks and further follow-up with me in 1 month to see if we need to titrate again.    Repeat blood pressure check in clinic had remained nearly the same.      Plan:       All questions answered.  Diagnosis explained in detail, including differential.  Follow up: Return in about 4 weeks (around 3/31/2020) for recheck ( come for lab 1-2 week).     Subjective:     Chief Complaint   Patient presents with     Annual Exam     Fasting today,  no questions or concerns no refills      Establish Care        Jl Sky is a 50 y.o. male who presents for an annual exam. The patient reports that there is not domestic violence in his life.   Patient is in same-sex relationship and recently had gonorrhea.  He is on [place] EP for HIV.    Healthy Habits:   Regular Exercise: Yes and No  Sunscreen Use: Yes  Healthy Diet: Yes  Dental Visits Regularly:  Yes  Seat Belt: Yes  Sexually active: Yes  Monthly Self Testicular Exams:  Yes  Hemoccults: No  Flex Sig: No  Colonoscopy: Yes  Lipid Profile: Yes  Glucose Screen: Yes  Prevention of Osteoporosis: N/A  Last Dexa: N/A  Guns at Home:  No      Immunization History   Administered Date(s) Administered     DTP 08/01/1974     Hep A, Adult IM (19yr & older) 05/07/1996, 06/24/1997     Hep A, historic 05/07/1996, 06/24/1997     Hep B, Adult 05/19/1998, 07/01/1998, 11/03/2011     Hep B, historic 05/19/1998, 07/01/1998, 11/03/2011     INFLUENZA,RECOMBINANT,INJ,PF QUADRIVALENT 18+YRS 11/11/2019     IPV 08/01/1974     Influenza, seasonal,quad inj 6-35 mos 11/09/2011, 01/07/2013, 01/17/2014, 10/03/2014     Influenza,W3H5-39,Pandemic,split,IM 03/17/2010     Influenza,seasonal, Inj IIV3 11/09/2011, 01/07/2013, 01/17/2014, 10/03/2014     Influenza,seasonal,quad inj =/> 6months 01/02/2019     MMR 08/01/1974     Tdap 01/07/2013     ZOSTER, RECOMBINANT, IM 11/11/2019, 01/13/2020     Immunization status: up to date and documented.    No exam data present    Current Outpatient Medications   Medication Sig Dispense Refill     emtricitabine-tenofovir, TDF, (TRUVADA) 200-300 mg per tablet Take 1 tablet by mouth daily. 90 tablet 3     omeprazole (PRILOSEC) 20 MG capsule Take 1 capsule (20 mg total) by mouth daily. 90 capsule 2     lisinopriL (PRINIVIL,ZESTRIL) 10 MG tablet Take 1 tablet (10 mg total) by mouth daily. 30 tablet 11     valACYclovir (VALTREX) 1000 MG tablet Take 2 tablets by mouth twice daily for 1 day as needed. 8 tablet 6     No current facility-administered medications for this visit.      No past medical history on file.  No past surgical history on file.  Patient has no known allergies.  Family History   Problem Relation Age of Onset     Hypertension Mother      Pulmonary embolism Father      Diabetes type II Father         500 lbs     Social History     Socioeconomic History     Marital status: Single     Spouse name: Not  on file     Number of children: Not on file     Years of education: Not on file     Highest education level: Not on file   Occupational History     Not on file   Social Needs     Financial resource strain: Not on file     Food insecurity:     Worry: Not on file     Inability: Not on file     Transportation needs:     Medical: Not on file     Non-medical: Not on file   Tobacco Use     Smoking status: Never Smoker     Smokeless tobacco: Never Used   Substance and Sexual Activity     Alcohol use: Yes     Frequency: 2-3 times a week     Drinks per session: 1 or 2     Binge frequency: Never     Drug use: Never     Sexual activity: Yes     Partners: Male   Lifestyle     Physical activity:     Days per week: Not on file     Minutes per session: Not on file     Stress: Not on file   Relationships     Social connections:     Talks on phone: Not on file     Gets together: Not on file     Attends Mandaeism service: Not on file     Active member of club or organization: Not on file     Attends meetings of clubs or organizations: Not on file     Relationship status: Not on file     Intimate partner violence:     Fear of current or ex partner: Not on file     Emotionally abused: Not on file     Physically abused: Not on file     Forced sexual activity: Not on file   Other Topics Concern     Not on file   Social History Narrative    Patient is single.  He is in same-sex relationship but currently not partnered.  He is a  and teaches choir and orchestra.        Samm Iraheta MD  7/17/2019                The 10-year ASCVD risk score (Radcliffetaylor PEREZ Jr., et al., 2013) is: 3.9%      Values used to calculate the score:        Age: 49 years        Sex: Male        Is Non- : No        Diabetic: No        Tobacco smoker: No        Systolic Blood Pressure: 134 mmHg        Is BP treated: No        HDL Cholesterol: 47 mg/dL        Total Cholesterol: 197 mg/dL       Review of Systems  General:  Denies  "problem  Eyes: Denies problem  Ears/Nose/Throat: Denies problem  Cardiovascular: Denies problem  Respiratory:  Denies problem  Gastrointestinal:  Denies problem  Genitourinary: Denies problem  Musculoskeletal:  Denies problem  Skin: Denies problem  Neurologic: Denies problem  Psychiatric: Denies problem  Endocrine: Denies problem  Heme/Lymphatic: Denies problem   Allergic/Immunologic: Denies problem        Objective:     Vitals:    03/03/20 0759 03/03/20 0855   BP: (!) 177/114 (!) 165/109   Pulse: 62 65   SpO2: 96%    Weight: (!) 229 lb 12.8 oz (104.2 kg)    Height: 6' 1.5\" (1.867 m)      Body mass index is 29.91 kg/m .    Physical  General Appearance: Alert, cooperative, no distress, appears stated age  Head: Normocephalic, without obvious abnormality, atraumatic  Throat: Lips, mucosa, and tongue normal; teeth and gums normal  Neck: Supple, symmetrical, trachea midline, no adenopathy;  thyroid: not enlarged, symmetric, no tenderness/mass/nodules; no carotid bruit or JVD  Lungs: Clear to auscultation bilaterally, respirations unlabored  Heart: Regular rate and rhythm, S1 and S2 normal, no murmur, rub, or gallop,   Extremities: Extremities normal, atraumatic, no cyanosis or edema  Skin: Skin color, texture, turgor normal, no rashes or lesions  Lymph nodes: Cervical, supraclavicular,nodes normal  Neurologic: He is alert. He has normal reflexes.   Psychiatric: He has a normal mood and affect.              "

## 2021-06-06 NOTE — PROGRESS NOTES
Please inform patient that his blood pressure was elevated.  He needs follow-up in clinic for blood pressure management.    Samm Iraheta MD  2/10/2020

## 2021-06-07 ENCOUNTER — COMMUNICATION - HEALTHEAST (OUTPATIENT)
Dept: INFECTIOUS DISEASES | Facility: CLINIC | Age: 51
End: 2021-06-07

## 2021-06-07 DIAGNOSIS — Z79.899 ON PRE-EXPOSURE PROPHYLAXIS FOR HIV: ICD-10-CM

## 2021-06-07 NOTE — TELEPHONE ENCOUNTER
Please inform patient that creatinine is already been done on 3/18/2020 and it is normal.  Patient can review this in my chart.  Samm Iraheta MD  4/28/2020

## 2021-06-07 NOTE — TELEPHONE ENCOUNTER
Orders being requested: The patient is requesting a creatinine lab test. The patient states he was instructed to return for a follow up on labs since starting Truvada July 2019.   Reason service is needed/diagnosis: Follow up for treatment with Truvada.   When are orders needed by: The patient is requesting to be notified when and if the labs are recommended. Please call the patient.  Where to send Orders: [facility]  Okay to leave detailed message?  Yes

## 2021-06-07 NOTE — TELEPHONE ENCOUNTER
Left message to call back for: Message below  Information to relay to patient:  MD's message below

## 2021-06-07 NOTE — TELEPHONE ENCOUNTER
Patient Returning Call  Reason for call:  Patient returning call  Information relayed to patient:  Message below  Patient has additional questions:  No  If YES, what are your questions/concerns:  n/a  Okay to leave a detailed message?: No call back needed

## 2021-06-09 NOTE — TELEPHONE ENCOUNTER
RN cannot approve Refill Request    RN can NOT refill this medication med is not covered by policy/route to provider     . Last office visit: 7/15/2019 Samm Iraheta MD Last Physical: 3/3/2020 Last MTM visit: Visit date not found Last visit same specialty: 1/30/2020 Jl Ventura MD.  Next visit within 3 mo: Visit date not found  Next physical within 3 mo: Visit date not found      Shyann Quintanilla, [facility] Connection Triage/Med Refill 6/23/2020    Requested Prescriptions   Pending Prescriptions Disp Refills     emtricitabine-tenofovir, TDF, (TRUVADA) 200-300 mg per tablet 90 tablet 3     Sig: Take 1 tablet by mouth daily.       There is no refill protocol information for this order

## 2021-06-10 NOTE — TELEPHONE ENCOUNTER
Refill Approved    Rx renewed per Medication Renewal Policy. Medication was last renewed on 11/19/19.    hSyann Quintanilla, [facility] Connection Triage/Med Refill 8/18/2020     Requested Prescriptions   Pending Prescriptions Disp Refills     omeprazole (PRILOSEC) 20 MG capsule 90 capsule 2     Sig: Take 1 capsule (20 mg total) by mouth.       GI Medications Refill Protocol Passed - 8/17/2020  3:11 PM        Passed - PCP or prescribing provider visit in last 12 or next 3 months.     Last office visit with prescriber/PCP: 7/15/2019 Samm Iraheta MD OR same dept: 1/30/2020 Jl Ventura MD OR same specialty: 1/30/2020 Jl Ventura MD  Last physical: 3/3/2020 Last MTM visit: Visit date not found   Next visit within 3 mo: Visit date not found  Next physical within 3 mo: Visit date not found  Prescriber OR PCP: Samm Iraheta MD  Last diagnosis associated with med order: 1. Esophageal reflux  - omeprazole (PRILOSEC) 20 MG capsule; Take 1 capsule (20 mg total) by mouth.  Dispense: 90 capsule; Refill: 2    If protocol passes may refill for 12 months if within 3 months of last provider visit (or a total of 15 months).

## 2021-06-10 NOTE — TELEPHONE ENCOUNTER
Please advise on when and how to schedule.    Procedure: Ambulatory referral to Infectious Disease Status: Needs Scheduling   Requested appt date:   Authorizing: Samm Iraheta MD in [facility] FAMILY MEDICINE/OB   Referral: [id number] (Not Needed)       Diagnosis: On pre-exposure prophylaxis for HIV [Z79.899

## 2021-06-10 NOTE — PROGRESS NOTES
"Jl Sky is a 50 y.o. male who is being evaluated via a billable video visit.      The patient has been notified of following:     \"This video visit will be conducted via a call between you and your physician/provider. We have found that certain health care needs can be provided without the need for an in-person physical exam.  This service lets us provide the care you need with a video conversation.  If a prescription is necessary we can send it directly to your pharmacy.  If lab work is needed we can place an order for that and you can then stop by our lab to have the test done at a later time.    Video visits are billed at different rates depending on your insurance coverage. Please reach out to your insurance provider with any questions.    If during the course of the call the physician/provider feels a video visit is not appropriate, you will not be charged for this service.\"    Patient has given verbal consent to a Video visit? Yes  How would you like to obtain your AVS? AVS Preference: MyChart.  If dropped by the video visit, the video invitation should be sent to: [phone]  Will anyone else be joining your video visit? No      Video Start Time: 8:45 am    Additional provider notes: Visit for preexposure prophylaxis for HIV    Patient is a 50-year-old gentleman referred by Samm Iraheta MD for HIV preexposure prophylaxis counseling     Patient is currently not in a monogamous relationship.  He was started on Truvada about a year ago, which he is tolerating well.    He was diagnosed with gonorrhea in January.  He was started on lisinopril in March.    We discussed the risks and benefits of true beta including that it protects against Truvada susceptible strains of HIV which constitutes most of the strain circulating, but not all of them.    We discussed that condoms are better protection against other sexually transmitted infections and Truvada does not protect against any other sexually " transmitted infections.    There was also some question about anal Pap smears, which are indicated for men having sex with men.  I did make referral to colorectal surgery for those Pap smears.      GENERAL: Healthy, alert and no distress  EYES: Eyes grossly normal to inspection. No discharge or erythema, or obvious scleral/conjunctival abnormalities.  RESP: No audible wheeze, cough, or visible cyanosis.  No visible retractions or increased work of breathing.    NEURO: Cranial nerves grossly intact. Mentation and speech appropriate for age.  PSYCH: Mentation appears normal, affect normal/bright, judgement and insight intact, normal speech and appearance well-groomed    Impression: Sexually active man who has sex with other men.    Recent infection of gonorrhea.    Hypertension, recently started on lisinopril.    Recommendations: Garima is still appropriate.  We discussed the benefit of taking it every day as opposed to taking it prior to sexual activity.    Orders Placed This Encounter   Procedures     Basic metabolic panel     Standing Status:   Standing     Number of Occurrences:   4     Standing Expiration Date:   8/3/2021     HIV Antigen/Antibody Screening [place]     Standing Status:   Standing     Number of Occurrences:   4     Standing Expiration Date:   8/3/2021     Ambulatory referral to Colorectal Surgery     Referral Priority:   Routine     Referral Type:   Surgical     Referral Reason:   Evaluation and Treatment     Requested Specialty:   Colon and Rectal Surgery     Number of Visits Requested:   1     Follow-up as needed.    Video-Visit Details    Type of service:  Video Visit    Video End Time (time video stopped): 9:00 am  Originating Location (pt. Location): Home    Distant Location (provider location):   [facility]     Platform used for Video Visit: Mindy Evans MD

## 2021-06-10 NOTE — TELEPHONE ENCOUNTER
Yes we do manage pt's on PrEp. Pt will need to see GI for HPV testing or if lesion Dermatology. We will schedule for HIV pre-exposure f/u.

## 2021-06-10 NOTE — TELEPHONE ENCOUNTER
Please ask infectious disease if they do preexposure prophylaxis for HIV patients.  Otherwise, do we refer them to [place] infectious disease?    This patient is on [place] EP for HIV.  I do not manage that.  In the past I have referred my patients to either HIV clinic/infectious disease clinic.    Samm Iraheta MD  7/28/2020

## 2021-06-10 NOTE — TELEPHONE ENCOUNTER
Date: 8/3/2020 Status: [facility]   Time: 8:40 AM Length: 40   Visit Type: VIDEO VISIT NEW [2862] Copay: $0.00   Provider: Norris Evans MD

## 2021-06-10 NOTE — TELEPHONE ENCOUNTER
Please inform patient that he is due for lab work in September for Truvada.  (Every 6 months)    Per previous discussion, this should be managed by infectious disease specialist.    A referral is being done..  .  Samm Iraheta MD  7/27/2020        Jl,     Your lab is normal.     I would suggest that you follow-up with an infectious disease specialist for future refill of Truvada.  This is noting that further cares may be needed including anal cancer screening, HPV etc.     Let me know if you would like a referral.     Samm Iraheta MD  1/29/2020

## 2021-06-10 NOTE — PROGRESS NOTES
Assessment:      Healthy male exam.    1. Routine general medical examination at a health care facility  Immunizations reviewed and updated .  Alcohol use, safety and moderation discussed.  Recommended adequate calcium intake/osteoporosis prevention.  Discussed colon cancer screening at age 50, 45 if -American.  Diet and exercise reviewed, including goal of aerobic exercise 30-90 minutes most days of the week, moderation of portions sizes, avoiding eating out and fast food and increase in fruits and vegetables.  Discussed safe sex practices.  Discussed sun protection.  Discussed weight management.    - Lipid [place] FASTING    2. Herpes Simplex Type I  Patient with oral herpes. Uses cycle of ear as needed for outbreaks. Recently has been having more outbreaks most likely due to stress. If patient continues to get them could consider suppressive therapy  - valACYclovir (VALTREX) 1000 MG tablet; TAKE 2 TABLETS BY MOUTH TWICE DAILY FOR 1 DAY AS NEEDED  Dispense: 8 tablet; Refill: 3    3. Gastroesophageal reflux disease without esophagitis  Patient with a history of reflux. He continues on the omeprazole. He is not tried to wean off we talk about a slow wean about every other day every 3rd day and then as needed. He will attempt to do that this year         Plan:       Diagnosis explained in detail, including differential.  Discussed healthy lifestyle modifications.    I have had an Advance Directives discussion with the patient.  The following high BMI interventions were performed this visit: encouragement to exercise, prescribed dietary intake and lifestyle education regarding diet      Subjective:      Jl Sky is a 47 y.o. male who presents for an annual exam. The patient reports that there is not domestic violence in his life.     Esophageal Reflux: He is still taking one dose of omeprazole once a day for symptoms. He is interested in weaning off.    Herpes: He is taking Valacyclovir as needed for  flare of oral herpes. Had two flare ups recently, which he feels could be due to stress. He generally has one flare every two years. He feels otherwise well managed on medication.     Healthy Habits:   Regular Exercise: Yes  Sunscreen Use: Yes  Healthy Diet: No  Dental Visits Regularly: No  Seat Belt: Yes  Sexually active: No  Monthly Self Testicular Exams:  Yes and No  Hemoccults: No  Flex Sig: N/A  Colonoscopy: No  Lipid Profile: due  Glucose Screen: N/A  Prevention of Osteoporosis: No  Last Dexa: N/A  Guns at Home:  No      Immunization History   Administered Date(s) Administered     DTP 08/01/1974     Hep A, historic 05/07/1996, 06/24/1997     Hep B, historic 05/19/1998, 07/01/1998, 11/03/2011     IPV 08/01/1974     Influenza, seasonal,quad inj 6-35 mos 11/09/2011, 01/07/2013, 01/17/2014, 10/03/2014     MMR 08/01/1974     Tdap 01/07/2013     Immunization status: up to date and documented.    No exam data present    Current Outpatient Prescriptions   Medication Sig Dispense Refill     omeprazole (PRILOSEC) 20 MG capsule TAKE 1 CAPSULE BY MOUTH DAILY 90 capsule 1     VALACYCLOVIR HCL (VALACYCLOVIR ORAL) 1 gm oral tablet    Take 2 tablets by mouth twice daily for 1 day as needed       valACYclovir (VALTREX) 1000 MG tablet TAKE 2 TABLETS BY MOUTH TWICE DAILY FOR 1 DAY AS NEEDED 8 tablet 3     No current facility-administered medications for this visit.      No past medical history on file.  No past surgical history on file.  Review of patient's allergies indicates no known allergies.  No family history on file.     He is getting  in July.  Social History     Social History     Marital status: Single     Spouse name: N/A     Number of children: N/A     Years of education: N/A     Occupational History     Not on file.     Social History Main Topics     Smoking status: Never Smoker     Smokeless tobacco: Not on file     Alcohol use Not on file     Drug use: Not on file     Sexual activity: Not on file     Other  "Topics Concern     Not on file     Social History Narrative       Review of Systems  General:  Denies problem  Eyes: Denies problem  Ears/Nose/Throat: Denies problem  Cardiovascular: Denies problem  Respiratory:  Denies problem  Gastrointestinal:  Denies problem  Genitourinary: Denies problem  Musculoskeletal:  Denies problem  Skin: Denies problem  Neurologic: Denies problem  Psychiatric: Denies problem  Endocrine: Denies problem  Heme/Lymphatic: Denies problem   Allergic/Immunologic: Denies problem        Objective:     Vitals:    04/19/17 0956 04/19/17 1029   BP: 148/86 138/88   Pulse: 72    Resp: 16    Temp: 98.7  F (37.1  C)    TempSrc: Oral    Weight: (!) 225 lb 6 oz (102.2 kg)    Height: 6' 1.25\" (1.861 m)      Body mass index is 29.53 kg/(m^2).    Physical  General Appearance: Alert, cooperative, no distress, appears stated age  Head: Normocephalic, without obvious abnormality, atraumatic  Eyes: PERRL, conjunctiva/corneas clear, EOM's intact  Ears: Normal TM's and external ear canals, both ears  Nose: Nares normal, septum midline,mucosa normal, no drainage  Throat: Lips, mucosa, and tongue normal; teeth and gums normal  Neck: Supple, symmetrical, trachea midline, no adenopathy;  thyroid: not enlarged, symmetric, no tenderness/mass/nodules; no carotid bruit or JVD  Back: Symmetric, no curvature, ROM normal, no CVA tenderness  Lungs: Clear to auscultation bilaterally, respirations unlabored  Heart: Regular rate and rhythm, S1 and S2 normal, no murmur, rub, or gallop.  Abdomen: Soft, non-tender, bowel sounds active all four quadrants,  no masses, no organomegaly  Musculoskeletal: Normal range of motion. No joint swelling or deformity.   Extremities: Extremities normal, atraumatic, no cyanosis or edema  Skin: Skin color, texture, turgor normal, no rashes or lesions  Lymph nodes: Cervical, supraclavicular, and axillary nodes normal  Neurologic: He is alert. He has normal reflexes.   Psychiatric: He has a normal " mood and affect.      ADDITIONAL HISTORY SUMMARIZED (2): None.  DECISION TO OBTAIN EXTRA INFORMATION (1): None.   RADIOLOGY TESTS (1): None.  LABS (1): Reviewed and ordered labs.  MEDICINE TESTS (1): None.  INDEPENDENT REVIEW (2 each): None.     The visit lasted a total of 28 minutes face to face with the patient. Over 50% of the time was spent conducting the physical.    I, Brook Haney, am scribing for and in the presence of, Dr. Hunt.    I, Dr. Hunt, personally performed the services described in this documentation, as scribed by Brook Haney in my presence, and it is both accurate and complete.    Total data points: 1

## 2021-06-10 NOTE — TELEPHONE ENCOUNTER
Who is calling:  Patient   Reason for Call:  Patient is questioning if he is due for lab work. Patient stated he started emtricitabine-tenofovir, TDF, (TRUVADA) 200-300 mg per tablet a year ago and was advised to have blood work done. Patient stated most recent labs for this was normal. Patient would like a call back.  Date of last appointment with primary care: n/a  Okay to leave a detailed message: Yes  [phone]

## 2021-06-10 NOTE — TELEPHONE ENCOUNTER
Called and spoke to patient. Informed him of note fro provider. Patient will f/u with infectious control.

## 2021-06-13 NOTE — TELEPHONE ENCOUNTER
LVMTCB x1    Per Dr Evans's lab note from 11/5/20 HIV antigen/antibody test. Ordered for pt to repeat creatinine this week

## 2021-06-13 NOTE — TELEPHONE ENCOUNTER
Requested Prescriptions     Pending Prescriptions Disp Refills     lisinopriL (PRINIVIL,ZESTRIL) 10 MG tablet 30 tablet 11     Sig: Take 1 tablet (10 mg total) by mouth daily.     Last Office Visit:  3/3/2020  Last Refill:  11/14/2020  CSA:  N/A  Date of Last Labs:  12/3/2020

## 2021-06-13 NOTE — TELEPHONE ENCOUNTER
Refill Approved    Rx renewed per Medication Renewal Policy. Medication was last renewed on 3/3/20.    Shyann Quintanilla, [facility] Triage/Med Refill 12/17/2020     Requested Prescriptions   Pending Prescriptions Disp Refills     lisinopriL (PRINIVIL,ZESTRIL) 10 MG tablet 30 tablet 11     Sig: Take 1 tablet (10 mg total) by mouth daily.       Ace Inhibitors Refill Protocol Passed - 12/15/2020  5:13 PM        Passed - PCP or prescribing provider visit in past 12 months       Last office visit with prescriber/PCP: 7/15/2019 Samm Iraheta MD OR same dept: 1/30/2020 Jl Ventura MD OR same specialty: 1/30/2020 Jl Ventura MD  Last physical: 3/3/2020 Last MTM visit: Visit date not found   Next visit within 3 mo: Visit date not found  Next physical within 3 mo: Visit date not found  Prescriber OR PCP: Samm Iraheta MD  Last diagnosis associated with med order: 1. Benign essential hypertension  - lisinopriL (PRINIVIL,ZESTRIL) 10 MG tablet; Take 1 tablet (10 mg total) by mouth daily.  Dispense: 30 tablet; Refill: 11    If protocol passes may refill for 12 months if within 3 months of last provider visit (or a total of 15 months).             Passed - Serum Potassium in past 12 months     Lab Results   Component Value Date    Potassium 5.0 12/03/2020             Passed - Blood pressure filed in past 12 months     BP Readings from Last 1 Encounters:   03/03/20 (!) 165/109             Passed - Serum Creatinine in past 12 months     Creatinine   Date Value Ref Range Status   12/03/2020 1.01 0.70 - 1.30 mg/dL Final

## 2021-06-15 ENCOUNTER — COMMUNICATION - HEALTHEAST (OUTPATIENT)
Dept: FAMILY MEDICINE | Facility: CLINIC | Age: 51
End: 2021-06-15

## 2021-06-16 PROBLEM — Z79.899 ON PRE-EXPOSURE PROPHYLAXIS FOR HIV: Status: ACTIVE | Noted: 2020-03-04

## 2021-06-16 RX ORDER — EMTRICITABINE AND TENOFOVIR DISOPROXIL FUMARATE 200; 300 MG/1; MG/1
1 TABLET, FILM COATED ORAL DAILY
Qty: 30 TABLET | Refills: 0 | Status: SHIPPED | OUTPATIENT
Start: 2021-06-16 | End: 2021-08-03

## 2021-06-16 NOTE — TELEPHONE ENCOUNTER
Telephone Encounter by Ingris Hudson LPN at 1/31/2020  1:10 PM     Author: Ingris Hudson LPN Service: -- Author Type: Licensed Nurse    Filed: 1/31/2020  1:12 PM Encounter Date: 1/31/2020 Status: Signed    : Ingris Hudson LPN (Licensed Nurse)       Patient Returning Call  Reason for call:  Patient retuning call   Information relayed to patient:  Jl Ventura MD           1/31/20 12:38 PM   Note      Test for gonorrhea done on 1-29-20 is POSITIVE.  You ave been appropriately treated.  The [place] [facility] will be notified and they are responsible for tracing down contacts.         Patient has additional questions:  No  If YES, what are your questions/concerns:  Patient verbalized understanding above message .  Okay to leave a detailed message?: No

## 2021-06-17 ENCOUNTER — AMBULATORY - HEALTHEAST (OUTPATIENT)
Dept: LAB | Facility: CLINIC | Age: 51
End: 2021-06-17

## 2021-06-17 DIAGNOSIS — Z79.899 ON PRE-EXPOSURE PROPHYLAXIS FOR HIV: ICD-10-CM

## 2021-06-17 LAB
ANION GAP SERPL CALCULATED.3IONS-SCNC: 12 MMOL/L (ref 5–18)
BUN SERPL-MCNC: 16 MG/DL (ref 8–22)
CALCIUM SERPL-MCNC: 9.4 MG/DL (ref 8.5–10.5)
CHLORIDE BLD-SCNC: 106 MMOL/L (ref 98–107)
CO2 SERPL-SCNC: 24 MMOL/L (ref 22–31)
CREAT SERPL-MCNC: 1.13 MG/DL (ref 0.7–1.3)
GFR SERPL CREATININE-BSD FRML MDRD: >60 ML/MIN/1.73M2
GLUCOSE BLD-MCNC: 100 MG/DL (ref 70–125)
HIV 1+2 AB+HIV1 P24 AG SERPL QL IA: NEGATIVE
POTASSIUM BLD-SCNC: 4.7 MMOL/L (ref 3.5–5)
SODIUM SERPL-SCNC: 142 MMOL/L (ref 136–145)

## 2021-06-17 NOTE — PATIENT INSTRUCTIONS - HE
"Patient Instructions by Samm Iraheta MD at 7/15/2019  8:10 AM     Author: Samm Iraheta MD Service: -- Author Type: Physician    Filed: 7/15/2019  8:59 AM Encounter Date: 7/15/2019 Status: Addendum    : Samm Iraheta MD (Physician)    Related Notes: Original Note by Samm Iraheta MD (Physician) filed at 7/15/2019  8:54 AM        RTC for lab only check for Creatinine that is Kidney function in 3 months.    Pursue Colonoscopy.    We will look further for pap smear ( anal ) in future.    Samm Iraheta MD  7/15/2019      Patient Education     Understanding Body Mass Index (BMI)  Body mass index (BMI) is a method of screening for a weight category using the ratio of your height to your weight. The BMI is a measure of overweight that is corrected for height. Knowing your BMI is a way to tell if you are at a healthy weight. The higher your BMI, the greater your risk for weight-related health problems.  What BMI means    BMI below 18.5: Underweight    BMI 18.5 to 24.9: Healthy weight or \"ideal body weight\"     BMI 25 to 29.9: Overweight    BMI 30 and over: Obese    BMI 40 and over: Severe obesity   Online BMI Calculators  Find your BMI with an online BMI calculator tool, such as these from the CDC:    BMI calculator for adults    BMI calculator for children and teens   Using the BMI chart  To figure out your BMI, find your height and weight (or the numbers closest to them) on the table below. Follow each column of numbers to where your height and weight meet on the table. That is your BMI.    Date Last Reviewed: 7/1/2016 2000-2017 [employer]. [address] [place] [address], [place] [address]. All rights reserved. This information is not intended as a substitute for professional medical care. Always follow your healthcare professional's instructions.                "

## 2021-06-17 NOTE — TELEPHONE ENCOUNTER
RN cannot approve Refill Request    RN can NOT refill this medication Protocol failed and NO refill given. Last office visit: 7/15/2019 Samm Iraheta MD Last Physical: 3/3/2020 Last MTM visit: Visit date not found Last visit same specialty: 1/30/2020 Jl Ventura MD.  Next visit within 3 mo: Visit date not found  Next physical within 3 mo: Visit date not found      Jl Lopez, [facility] [place] Connection Triage/Med Refill 5/12/2021    Requested Prescriptions   Pending Prescriptions Disp Refills     omeprazole (PRILOSEC) 20 MG capsule 90 capsule 2     Sig: Take 1 capsule (20 mg total) by mouth.       GI Medications Refill Protocol Failed - 5/11/2021  1:08 PM        Failed - PCP or prescribing provider visit in last 12 or next 3 months.     Last office visit with prescriber/PCP: 7/15/2019 Samm Iraheta MD OR same dept: Visit date not found OR same specialty: 1/30/2020 Jl Ventura MD  Last physical: 3/3/2020 Last MTM visit: Visit date not found   Next visit within 3 mo: Visit date not found  Next physical within 3 mo: Visit date not found  Prescriber OR PCP: Samm Iraheta MD  Last diagnosis associated with med order: 1. Esophageal reflux  - omeprazole (PRILOSEC) 20 MG capsule; Take 1 capsule (20 mg total) by mouth.  Dispense: 90 capsule; Refill: 2    If protocol passes may refill for 12 months if within 3 months of last provider visit (or a total of 15 months).

## 2021-06-17 NOTE — PROGRESS NOTES
ASSESSMENT & PLAN:  1. Routine physical examination  Immunizations reviewed and updated .  Alcohol use, safety and moderation discussed.  Recommended adequate calcium intake/osteoporosis prevention.  Discussed colon cancer screening at age 50, 45 if -American.  Diet and exercise reviewed, including goal of aerobic exercise 30-90 minutes most days of the week, moderation of portions sizes, avoiding eating out and fast food and increase in fruits and vegetables.  Discussed safe sex practices.  Discussed sun protection.  Discussed weight management.    - Lipid Cascade  - Comprehensive Metabolic Panel    2. Gastroesophageal reflux disease without esophagitis  Patient continues to have reflux symptoms.  It has gotten better and he does not use the omeprazole daily but use it as needed    3. Herpes Simplex Type I  Patient with a history of oral herpes.  He has Valtrex to use whenever he gets an outbreak      Orders Placed This Encounter   Procedures     Lipid Cascade     Order Specific Question:   Fasting is required?     Answer:   Yes     Comprehensive Metabolic Panel     Medications Discontinued During This Encounter   Medication Reason     VALACYCLOVIR HCL (VALACYCLOVIR ORAL)        No Follow-up on file.    I have had an Advance Directives discussion with the patient.  The following high BMI interventions were performed this visit: encouragement to exercise and dietary management education, guidance, and counseling    CHIEF COMPLAINT:  Chief Complaint   Patient presents with     Annual Exam     Physical, fasting labs, no concerns     Concerns     taking vit D and B was wonderning it he needed to take them, it has turned his urine to bright yellow       HISTORY OF PRESENT ILLNESS:  Jl is a 48 y.o. male presenting to the clinic today for a physical examination. He is fasting today. Overall, he is feeling well.     REVIEW OF SYSTEMS:   He is interested in weight loss and is planning to find time in his schedule,  with a goal of losing 10 pounds. He drinks alcohol, 6 drinks during a week. He feels that he has been drinking more throughout his divorce. He is taking vitamin D and vitamin B complex. He has noticed a change in his urine, appearing bright yellow. He does not notice a change in energy levels with taking vitamin B. He takes omeprazole every other day. All other systems are negative.    PFSH:  Social History:  The patient reports that there is not domestic violence in his life. He is in the midst of a divorce.   Regular Exercise: no, active at work. Occasional walks.   Sunscreen Use: yes  Healthy Diet: yes  Dental Visits Regularly: yes  Sexually active: no  Monthly Testicular Exam:  Yes, no problems with urination.   Hemoccults:  not applicable  Colonoscopy/Flex Sig: not applicable  Lipid Profile:  yes  Glucose Screen:  yes  Prevention of Osteoporosis: not applicable   Last DXA: not applicable      Social History     Social History     Marital status: Single     Spouse name: N/A     Number of children: N/A     Years of education: N/A     Occupational History     Not on file.     Social History Main Topics     Smoking status: Never Smoker     Smokeless tobacco: Never Used     Alcohol use Not on file     Drug use: Not on file     Sexual activity: Not on file     Other Topics Concern     Not on file     Social History Narrative       History   Smoking Status     Never Smoker   Smokeless Tobacco     Never Used       Family History:  No family history on file.    Past Medical History:  Active Ambulatory Problems     Diagnosis Date Noted     Overweight      Hemorrhoids      Herpes Simplex Type I      Esophageal Reflux      Allergic Rhinitis      Wheezing (Symptom)      Snoring (Symptom)      Resolved Ambulatory Problems     Diagnosis Date Noted     No Resolved Ambulatory Problems     No Additional Past Medical History       Allergies: No Known Allergies    Immunization History   Administered Date(s) Administered     DTP  "08/01/1974     Hep A, historic 05/07/1996, 06/24/1997     Hep B, historic 05/19/1998, 07/01/1998, 11/03/2011     IPV 08/01/1974     Influenza, seasonal,quad inj 6-35 mos 11/09/2011, 01/07/2013, 01/17/2014, 10/03/2014     MMR 08/01/1974     Tdap 01/07/2013     Immunization status: up to date and documented    Surgical History:  No past surgical history on file.    VITALS:  Vitals:    05/02/18 1035 05/02/18 1114   BP: (!) 142/100 (!) 146/100   Patient Site: Left Arm Left Arm   Patient Position: Sitting Sitting   Cuff Size: Adult Regular    Pulse: 64    Temp: 98.4  F (36.9  C)    TempSrc: Oral    Weight: (!) 228 lb (103.4 kg)    Height: 6' 1.23\" (1.86 m)      Wt Readings from Last 3 Encounters:   05/02/18 (!) 228 lb (103.4 kg)   04/19/17 (!) 225 lb 6 oz (102.2 kg)   04/02/15 220 lb (99.8 kg)     Body mass index is 29.89 kg/(m^2).    PHYSICAL EXAM:  General Appearance: Reveals an alert, pleasant male.   Vitals:  Per nursing notes.  Head: Normocephalic, without obvious abnormality, atraumatic   Eyes: PERRL, conjunctiva/corneas clear, EOM's intact   Ears: Normal TM's and external ear canals, both ears   Nose: Nares normal, septum midline, mucosa normal, no drainage   Throat: Lips, mucosa, and tongue normal   Neck: Supple, symmetrical, trachea midline, no adenopathy; thyroid: not enlarged, symmetric, no tenderness/mass/nodules   Back: Symmetric, no curvature, ROM normal, no CVA tenderness  Lungs: Clear to auscultation bilaterally, respirations unlabored, no wheezing or rales   Heart: Regular rate and rhythm, S1 and S2 normal, no murmur, rub, or gallop, no carotid bruits  Abdomen: Soft, non-tender, bowel sounds active all four quadrants, no masses, no organomegaly,  Extremities: Extremities normal, atraumatic, no cyanosis or edema   Skin: Skin color, texture, turgor normal, no rashes or lesions. SK on right flank.   Lymph nodes: Cervical, supraclavicular, and axillary nodes normal.  Neurologic: Normal   Psych: Normal " affect. Does not appear anxious or depressed.     DATA REVIEWED:  Additional history summarized (from old records or history from someone other than the patient or another healthcare provider) (2 TOTAL): None.     Decision to obtain extra information (old records requested or history from another person or accessing Care Everywhere) (1 TOTAL): None.     Radiology tests summarized or ordered (XRAY/CT/MRI/DXA) (1 TOTAL): None.    Labs reviewed or ordered (1 TOTAL): Reviewed and ordered labs.    Medicine tests summarized or ordered (EKG/ECHO) (1 TOTAL): None.    Independent review of EKG or X-Ray (2 EACH): None.       The visit lasted a total of 21 minutes face to face with the patient. Over 50% of the time was spent conducting the physical.    I, Brook Haney, am scribing for and in the presence of, Dr. Hunt.    Jagruti MAGAÑA MD, personally performed the services described in this documentation, as scribed by Brook Haney in my presence, and it is both accurate and complete.    MEDICATIONS:  Current Outpatient Prescriptions   Medication Sig Dispense Refill     omeprazole (PRILOSEC) 20 MG capsule TAKE 1 CAPSULE BY MOUTH DAILY 90 capsule 0     valACYclovir (VALTREX) 1000 MG tablet TAKE 2 TABLETS BY MOUTH TWICE DAILY FOR 1 DAY AS NEEDED 8 tablet 2     No current facility-administered medications for this visit.        Total Data Points: 1

## 2021-06-18 NOTE — PATIENT INSTRUCTIONS - HE
Patient Instructions by Samm Iraheta MD at 3/3/2020  8:10 AM     Author: Samm Iraheta MD Service: -- Author Type: Physician    Filed: 3/3/2020  8:49 AM Encounter Date: 3/3/2020 Status: Signed    : Samm Iraheta MD (Physician)       Patient Education     Controlling High Blood Pressure  High blood pressure (hypertension) is often called the silent killer. This is because many people who have it, dont know it. It can be very dangerous High blood pressure can raise your risk of heart attack, stroke, heart disease, and heart failure. Controlling your blood pressure can decrease your risk of these problems. It's important to know the appropriate blood pressure range and remember to check your blood pressure regularly. Doing so can save your life.  Blood pressure measurements are given as 2 numbers. Systolic blood pressure is the upper number. This is the pressure when the heart contracts. Diastolic blood pressure is the lower number. This is the pressure when the heart relaxes between beats.  Blood pressure is categorized as normal, elevated, or stage 1 or stage 2 high blood pressure:    Normal blood pressure is systolic of less than 120 and diastolic of less than 80 (120/80)    Elevated blood pressure is systolic of 120 to 129 and diastolic less than 80    Stage 1 high blood pressure is systolic is 130 to 139 or diastolic between 80 to 89    Stage 2 high blood pressure is when systolic is 140 or higher or the diastolic is 90 or higher  A heart-healthy lifestyle can help you control your blood pressure without medicines. Here are some things you can do to pursue a heart-healthy lifestyle:    Choose heart-healthy foods    Select low-salt, low-fat foods. Limit sodium intake to 2,400 mg per day or the amount suggested by your healthcare provider.    Limit canned, dried, cured, packaged, and fast foods. These can contain a lot of salt.    Eat 8 to 10 servings of fruits and vegetables every  day.    Choose lean meats, fish, or chicken.    Eat whole-grain pasta, brown rice, and beans.    Eat 2 to 3 servings of low-fat or fat-free dairy products.    Ask your doctor about the DASH eating plan. This plan helps reduce blood pressure.    When you go to a restaurant, ask that your meal be prepared with no added salt.    Stay at a healthy weight    Ask your healthcare provider how many calories to eat a day. Then stick to that number.    Ask your healthcare provider what weight range is healthiest for you. If you are overweight, a weight loss of only 3% to 5% of your body weight can help lower blood pressure. Generally, a good weight loss goal is to lose 10% of your body weight in a year.    Limit snacks and sweets.    Get regular exercise.    Get up and get active    Find activities you enjoy that can be done alone or with friends or family. Such activities might include bicycling, dancing, walking, or jogging.    Park farther away from building entrances to walk more.    Use stairs instead of the elevator.    When you can, walk or bike instead of driving.    [place] leaves, garden, or do household repairs.    Be active at a moderate to vigorous level of physical activity for at least 40 minutes for a minimum of 3 to 4 days a week.     Manage stress    Make time to relax and enjoy life. Find time to laugh.    Communicate your concerns with your loved ones and your healthcare provider.    Visit with family and friends, and keep up with hobbies.    Limit alcohol and quit smoking    Men should have no more than 2 drinks per day.    Women should have no more than 1 drink per day.    Talk with your healthcare provider about quitting smoking. Smoking significantly increases your risk for heart disease and stroke. Ask your healthcare provider about community smoking cessation programs and other options.    Medicines  If lifestyle changes arent enough, your healthcare provider may prescribe high blood pressure medicine.  Take all medicines as prescribed. If you have any questions about your medicines, ask your healthcare provider before stopping or changing them.  Date Last Reviewed: 4/27/2016 2000-2019 [employer]. [address] [place] [address], [place] [address]. All rights reserved. This information is not intended as a substitute for professional medical care. Always follow your healthcare professional's instructions.

## 2021-06-20 NOTE — LETTER
Letter by Jl Ventura MD at      Author: Jl Ventura MD Service: -- Author Type: --    Filed:  Encounter Date: 1/31/2020 Status: (Other)         Jl Sky  [address] [facility] [address]             January 31, 2020         Dear Mr. Sky,    Below are the results from your recent visit:    Resulted Orders   Chlamydia trachomatis & Neisseria gonorrhoeae, Amplified Detection   Result Value Ref Range    Chlamydia trachomatis, Amplified Detection Negative Negative    Neisseria gonorrhoeae, Amplified Detection Positive (!) Negative       Hi Jl:  The STD screening in the office was POSITIVE for Gonorrhea.  This was treated with an intramuscular injection of 250 mg ceftriaxone.  The [place] [facility] should be contacting you to follow up on possible contacts.  Your were also covered with Zithromax in case of chlamydia.  The chlamydia screening is negative.  Please establish care with your new provider in the near future.    Please call with questions or contact us using [employer].    Sincerely,        Electronically signed by Jl Ventura MD

## 2021-06-22 NOTE — PROGRESS NOTES
Assessment & Plan:  1. Gastroesophageal reflux disease without esophagitis   Patient with intermittent reflux. He takes omeprazole as needed.  Plan to continue at this time     2. Overweight  Patient weight is up 5 pounds.  We talked at length about a months of losing weight and changing eating habits.  Patient was given resources to help.  Also reviewed screening he is due for flu shot today and will check insurance if cold guard is covered for screening        Orders Placed This Encounter   Procedures     Influenza, Seasonal,Quad Inj, 36+ MOS     Medications Discontinued During This Encounter   Medication Reason     penicillin VK (PEN VK) 500 MG tablet Therapy completed       Return in about 6 months (around 7/2/2019) for Annual physical.    The following high BMI interventions were performed this visit: encouragement to exercise and dietary management education, guidance, and counseling      This note was created use Dragon Dictation.  There may be sound alike errors present.       Chief Complaint:   Chief Complaint   Patient presents with     Follow-up     6 month f/u/cholesterol        History of Present Illness:  Jl is a 48 y.o. male presenting to the clinic today for follow-up of the illness  around [place] time.  Patient was seen in clinic and diagnosed with strep pharyngitis and started on penicillin he is taking antibiotics and is done well and is back to baseline.  Patient continues to have intermittent reflux.  He does take omeprazole but uses it as needed and tries not to take it daily.  Patient is trying to work on more exercise and healthy eating habits to lose weight.  In the past his cholesterols been elevated and his blood pressures been borderline high.  He is due for a physical in May.  Patient has gained weight since his last visit.  Patient also will be 49 this year..     Review of Systems:  All other systems are negative.     PFSH:  Patient states his divorce is complete.  He does not  "have a roommate to help out with the mortgage    Tobacco Use:  Social History     Tobacco Use   Smoking Status Never Smoker   Smokeless Tobacco Never Used       Vitals:  Vitals:    01/02/19 0805 01/02/19 0833   BP: (!) 156/110 (!) 132/92   Patient Site: Left Arm Left Arm   Patient Position: Sitting    Cuff Size: Adult Regular    Pulse: 66    Temp: 97.9  F (36.6  C)    TempSrc: Oral    Weight: (!) 234 lb 6.4 oz (106.3 kg)    Height: 6' 1.23\" (1.86 m)      Wt Readings from Last 3 Encounters:   01/02/19 (!) 234 lb 6.4 oz (106.3 kg)   12/26/18 (!) 231 lb 6.4 oz (105 kg)   05/02/18 (!) 228 lb (103.4 kg)     Body mass index is 30.73 kg/m .      Physical Exam:  Constitutional:  Reveals an alert, cooperative,  male in no acute distress.  Vitals:  Per nursing notes.  Ears:  Clear.  Oropharynx:  Without posterior nasal drainage or thrush.  Neck:  Supple, no lymphadenopathy,  thyroid not palpable.  Cardiac:  Regular rate and rhythm without murmurs, rubs, or gallops.   Lungs: Clear.  Respiratory effort normal.  Neurologic:  No gross focal deficits.    Psychiatric:  Mood appropriate, memory intact.     Data Reviewed:  Additional history summarized (from old records or history from someone other than the patient or another healthcare provider) (2 TOTAL): 2 reviewed visit in Dec    Decision to obtain extra information (old records requested or history from another person or accessing Care Everywhere) (1 TOTAL): None.     Radiology tests summarized or ordered (XRAY/CT/MRI/DXA) (1 TOTAL): None.    Labs reviewed or ordered (1 TOTAL): 1 reviewed labs.    Medicine tests summarized or ordered (EKG/ECHO) (1 TOTAL): None.    Independent review of EKG or X-Ray (2 EACH): None.       The visit lasted a total of 25 minutes face to face with the patient. Over 50% of the time was spent counseling and educating the patient about weight loss.        Medications:  Current Outpatient Medications   Medication Sig Dispense Refill     omeprazole " (PRILOSEC) 20 MG capsule Take 1 capsule (20 mg total) by mouth daily. 90 capsule 3     valACYclovir (VALTREX) 1000 MG tablet Take 2 tablets by mouth twice daily for 1 day as needed. 8 tablet 6     No current facility-administered medications for this visit.        Total Data Points: 3Patient with intermittent reflux.

## 2021-06-22 NOTE — PROGRESS NOTES
Jl was seen today for sore throat and fever.    Diagnoses and all orders for this visit:    Acute streptococcal pharyngitis  -     penicillin VK (PEN VK) 500 MG tablet; Take 1 tablet (500 mg total) by mouth 3 (three) times a day for 10 days.   Ibuprofen 400 mg every 6 hours as needed for pain, inflammation, or fever.  Take Acetaminophen (Tylenol) 1000 mg every 6 hours as needed for pain or fever. Do not take more than 4000 mg in a 24 hour period.     Take Ibuprofen/tylenol together every 6 hours for 3-4 days     Throw away toothbrush in 1-2 days    Sore throat  -     Rapid Strep A Screen-Throat        Recent Results (from the past 24 hour(s))   Rapid Strep A Screen-Throat   Result Value Ref Range    Rapid Strep A Antigen Group A Strep detected (!) No Group A Strep detected, presumptive negative     Follow up as needed for no improvement in symptoms.     Subjective: Jl Sky is a 48 y.o. year old malewho presents with sore throat. Symptoms started  1 day ago. Associated symptoms include fever. Denies congestion, cough, vomiting or diarrhea. Home treatment of symptoms includes Ibuprofen for fever and pain.    ROS  General: No weight changes, fatigue, weaknesses, chills, fever, night sweats.  Ears: No hearing loss, tinnitus, vertigo (spinning), discharge, ear pain, ear pressure or fullness.  Nose/Sinuses: No rhinorrhea, nasal congestion, sneezing, itching, allergy, epistaxis, sinus pain/pressure.  Mouth/throat/neck: No bleeding gums, hoarseness, swollen neck.  Resp: No shortness of breath, wheeze, cough, sputum, hemoptysis, pain with respiration. no history of  pneumonia, asthma, bronchitis, emphysema, TB.    Strep exposure none  known.  Mono exposure none none.  History of tonsillectomy no.    Social History     Tobacco Use   Smoking Status Never Smoker   Smokeless Tobacco Never Used       Patient Active Problem List   Diagnosis     Overweight     Hemorrhoids     Herpes Simplex Type I     Esophageal Reflux      Allergic Rhinitis     Wheezing (Symptom)     Snoring (Symptom)     No past medical history on file.  Current Outpatient Medications on File Prior to Visit   Medication Sig Dispense Refill     omeprazole (PRILOSEC) 20 MG capsule Take 1 capsule (20 mg total) by mouth daily. 90 capsule 3     valACYclovir (VALTREX) 1000 MG tablet Take 2 tablets by mouth twice daily for 1 day as needed. 8 tablet 6     No current facility-administered medications on file prior to visit.      No Known Allergies    Objective: /89 (Patient Site: Left Arm, Patient Position: Sitting, Cuff Size: Adult Large)   Pulse 71   Temp 98.5  F (36.9  C) (Oral)   Resp 16   Wt (!) 231 lb 6.4 oz (105 kg)   BMI 30.34 kg/m    General: Patient appears to be in no acute distress.  Ears: No nodules, lesions, masses, discharge or tenderness in auricles or mastoid area. no cerumen in ear canals. Tympanic membranes pearly gray, normal bony landmarks and cone of light bilaterally, no bulges or perforations.  Oropharynx: Uvula midline. Pharynx with erythema, exudates on bilateral tonsils. Tonsils + 3.  Lymph: bilateral submandibular Lymph nodes are palpable and tender.  Lungs: Unlabored. Clear breath sounds heard throughout lung fields.  Heart: Regular rate and rhythm.      Ashli Pires RN, CNP

## 2021-06-22 NOTE — PATIENT INSTRUCTIONS - HE
The Obesity Code - Dr Mo Durbin  - Low carbs and intermittent fasting    We have ordered a test called the Cologuard for colon cancer screening.  This test is FDA approved, however some insurances consider experimental and do not cover it.  It is your responsibility to check with your insurance company prior to submitting the test to verify coverage.  If you find that the test is not covered, and you already received the sample container, you can send back to the company.

## 2021-06-25 NOTE — TELEPHONE ENCOUNTER
1 [facility] rx signed. Needs appointment before additional refills.    Please assist patient in scheduling  appointment.

## 2021-06-25 NOTE — TELEPHONE ENCOUNTER
I called LM on the pt's personal VM informing that the MD is out and when he returns next week I will discuss f/up instructions and contact him back.

## 2021-06-25 NOTE — TELEPHONE ENCOUNTER
RN cannot approve Refill Request    RN can NOT refill this medication med is not covered by policy/route to provider. Last office visit: 7/15/2019 Samm Iraheta MD Last Physical: 3/3/2020 Last MTM visit: Visit date not found Last visit same specialty: 1/30/2020 Jl Ventura MD.  Next visit within 3 mo: Visit date not found  Next physical within 3 mo: Visit date not found      Elva Stack, [facility] Connection Triage/Med Refill 6/15/2021    Requested Prescriptions   Pending Prescriptions Disp Refills     emtricitabine-tenofovir, TDF, (TRUVADA) 200-300 mg per tablet 90 tablet 3     Sig: Take 1 tablet by mouth daily.       There is no refill protocol information for this order

## 2021-06-25 NOTE — TELEPHONE ENCOUNTER
Yes, he should have lab work every three months. (I believe orders were written as standing.)     Virtual visit or clinic visit annually with me, or his PCP could do this as well.     Norris Evans MD

## 2021-06-25 NOTE — TELEPHONE ENCOUNTER
Patient called. He is wondering if he should continue to come in for lab work every 3 months? When should he come back for lab work? What does Dr. Evans want him to do for follow ups?     Jl @ [phone]

## 2021-06-26 ENCOUNTER — HEALTH MAINTENANCE LETTER (OUTPATIENT)
Age: 51
End: 2021-06-26

## 2021-07-01 ENCOUNTER — RECORDS - HEALTHEAST (OUTPATIENT)
Dept: ADMINISTRATIVE | Facility: OTHER | Age: 51
End: 2021-07-01

## 2021-07-04 NOTE — ADDENDUM NOTE
Addendum Note by Lona Guerra RN at 6/14/2021  9:54 AM     Author: Lona Guerra RN Service: -- Author Type: Registered Nurse    Filed: 6/14/2021  9:54 AM Encounter Date: 6/7/2021 Status: Signed    : Lona Guerra RN (Registered Nurse)    Addended by: LONA GUERRA on: 6/14/2021 09:54 AM        Modules accepted: Orders

## 2021-07-12 ENCOUNTER — ALLIED HEALTH/NURSE VISIT (OUTPATIENT)
Dept: CARDIOLOGY | Facility: CLINIC | Age: 51
End: 2021-07-12
Payer: COMMERCIAL

## 2021-07-12 DIAGNOSIS — Z00.6 EXAMINATION OF PARTICIPANT IN CLINICAL TRIAL: Primary | ICD-10-CM

## 2021-07-12 PROCEDURE — 99207 PR NO CHARGE NURSE ONLY: CPT

## 2021-07-28 ENCOUNTER — OFFICE VISIT (OUTPATIENT)
Dept: FAMILY MEDICINE | Facility: CLINIC | Age: 51
End: 2021-07-28
Payer: COMMERCIAL

## 2021-07-28 VITALS
WEIGHT: 226.6 LBS | HEART RATE: 64 BPM | TEMPERATURE: 98.3 F | RESPIRATION RATE: 10 BRPM | SYSTOLIC BLOOD PRESSURE: 170 MMHG | BODY MASS INDEX: 29.49 KG/M2 | OXYGEN SATURATION: 98 % | DIASTOLIC BLOOD PRESSURE: 104 MMHG

## 2021-07-28 DIAGNOSIS — B35.6 JOCK ITCH: Primary | ICD-10-CM

## 2021-07-28 PROCEDURE — 99213 OFFICE O/P EST LOW 20 MIN: CPT | Performed by: PHYSICIAN ASSISTANT

## 2021-07-28 RX ORDER — CLOTRIMAZOLE 1 %
CREAM (GRAM) TOPICAL 2 TIMES DAILY
Qty: 30 G | Refills: 0 | Status: SHIPPED | OUTPATIENT
Start: 2021-07-28 | End: 2021-08-11

## 2021-07-28 NOTE — PATIENT INSTRUCTIONS
Patient was educated on the natural course of condition. A yeast infection is caused by a normally found fungus called candida. Apply medication as prescribed. Side effects discussed. Conservative measures discussed including avoid tight fitting clothes, and wear cotton underwear.  See your primary care provider if symptoms worsen or do not improve in 7 days. Seek emergency care if you develop severe pelvic pain.

## 2021-07-28 NOTE — PROGRESS NOTES
URGENT CARE VISIT:    SUBJECTIVE:   HPI:   Jl Sky is a 51 year old who presents with rash located over left groin since 1 day(s) ago. Rash is sudden onset and rash seems to be worsening. He describes rash as itching and red. Patient denies difficulty breathing or throat/tongue swelling. Patient has tried none with no relief of symptoms. Patient has not had contact exposures to new laundry detergents, soaps, lotions, or other potential irritants. Denies new foods or medications.  Patient denies previous history of a similar rash. No one around them has had a similar rash.     PMH:   Past Medical History:   Diagnosis Date     Hypertension 2019     Allergies: Patient has no known allergies.   Medications:   Current Outpatient Medications   Medication Sig Dispense Refill     clotrimazole (LOTRIMIN) 1 % external cream Apply topically 2 times daily for 14 days 30 g 0     blood pressure monitor Kit [BLOOD PRESSURE MONITOR KIT] Use 1 kit As Directed daily as needed. 1 each 0     emtricitabine-tenofovir, TDF, (TRUVADA) 200-300 mg per tablet [EMTRICITABINE-TENOFOVIR, TDF, (TRUVADA) 200-300 MG PER TABLET] Take 1 tablet by mouth daily. Needs appointment before additional refills. 30 tablet 0     lisinopriL (PRINIVIL,ZESTRIL) 10 MG tablet [LISINOPRIL (PRINIVIL,ZESTRIL) 10 MG TABLET] Take 1 tablet (10 mg total) by mouth daily. 90 tablet 1     omeprazole (PRILOSEC) 20 MG capsule [OMEPRAZOLE (PRILOSEC) 20 MG CAPSULE] Take 1 capsule (20 mg total) by mouth daily before breakfast. 90 capsule 2     valACYclovir (VALTREX) 1000 MG tablet [VALACYCLOVIR (VALTREX) 1000 MG TABLET] Take 2 tablets by mouth twice daily for 1 day as needed. 8 tablet 6     Social History:   Social History     Socioeconomic History     Marital status:      Spouse name: Not on file     Number of children: 0     Years of education: Not on file     Highest education level: Not on file   Occupational History     Not on file   Tobacco Use     Smoking  status: Never Smoker     Smokeless tobacco: Never Used   Substance and Sexual Activity     Alcohol use: Yes     Comment: Alcoholic Drinks/day: 3 days per week, 2 drinks at a time     Drug use: Never     Sexual activity: Not Currently     Partners: Male   Other Topics Concern     Not on file   Social History Narrative    Patient is single.  He is in same-sex relationship but currently not partnered.  He is a  and teaches choir and orchestra.    Samm Iraheta MD  7/17/2019        The 10-year ASCVD risk score (Lulytaylor PEREZ Jr., et al., 2013) is: 3.9%    Values  used to calculate the score:      Age: 49 years      Sex: Male      Is Non- : No      Diabetic: No      Tobacco smoker: No      Systolic Blood Pressure: 134 mmHg      Is BP treated: No      HDL Cholesterol: 47 mg/dL      Total Cho lesterol: 197 mg/dL       Social Determinants of Health     Financial Resource Strain:      Difficulty of Paying Living Expenses:    Food Insecurity:      Worried About Running Out of Food in the Last Year:      Ran Out of Food in the Last Year:    Transportation Needs:      Lack of Transportation (Medical):      Lack of Transportation (Non-Medical):    Physical Activity:      Days of Exercise per Week:      Minutes of Exercise per Session:    Stress:      Feeling of Stress :    Social Connections:      Frequency of Communication with Friends and Family:      Frequency of Social Gatherings with Friends and Family:      Attends Yazidi Services:      Active Member of Clubs or Organizations:      Attends Club or Organization Meetings:      Marital Status:    Intimate Partner Violence:      Fear of Current or Ex-Partner:      Emotionally Abused:      Physically Abused:      Sexually Abused:        ROS: ROS otherwise found to be negative except as noted above.    OBJECTIVE:  BP (!) 170/104 (BP Location: Left arm, Patient Position: Sitting, Cuff Size: Adult Regular)   Pulse 64   Temp 98.3  F (36.8  C)  (Oral)   Resp 10   Wt 102.8 kg (226 lb 9.6 oz)   SpO2 98%   BMI 29.49 kg/m    General: WDWN in NAD.   Eyes: Non-injected conjunctivas without drainage bilaterally.  Cardiac: RRR without murmurs, rubs, or gallops.  Respiratory: LCTAB without adventitious sounds. Non-labored breathing.  Integumentary:   Distribution: localized  Location: left inguinal fold    Color: red,  Lesion type: macular, confluent with no other findings  Neuro: Alert and oriented.     ASSESSMENT:     ICD-10-CM    1. Jock itch  B35.6 clotrimazole (LOTRIMIN) 1 % external cream        PLAN:  Patient Instructions   Patient was educated on the natural course of condition. A yeast infection is caused by a normally found fungus called candida. Apply medication as prescribed. Side effects discussed. Conservative measures discussed including avoid tight fitting clothes, and wear cotton underwear.  See your primary care provider if symptoms worsen or do not improve in 7 days. Seek emergency care if you develop severe pelvic pain. Patient verbalized understanding and is agreeable to plan. The patient was discharged ambulatory and in stable condition.    Aide Mg PA-C on 7/28/2021 at 12:11 PM

## 2021-08-03 ENCOUNTER — TELEPHONE (OUTPATIENT)
Dept: INFECTIOUS DISEASES | Facility: CLINIC | Age: 51
End: 2021-08-03

## 2021-08-03 ENCOUNTER — OFFICE VISIT (OUTPATIENT)
Dept: FAMILY MEDICINE | Facility: CLINIC | Age: 51
End: 2021-08-03
Payer: COMMERCIAL

## 2021-08-03 VITALS
OXYGEN SATURATION: 97 % | WEIGHT: 227.6 LBS | SYSTOLIC BLOOD PRESSURE: 151 MMHG | BODY MASS INDEX: 29.21 KG/M2 | HEART RATE: 62 BPM | DIASTOLIC BLOOD PRESSURE: 110 MMHG | HEIGHT: 74 IN

## 2021-08-03 DIAGNOSIS — Z79.899 ON PRE-EXPOSURE PROPHYLAXIS FOR HIV: ICD-10-CM

## 2021-08-03 DIAGNOSIS — Z00.00 ROUTINE GENERAL MEDICAL EXAMINATION AT A HEALTH CARE FACILITY: Primary | ICD-10-CM

## 2021-08-03 DIAGNOSIS — Z79.899 ON PRE-EXPOSURE PROPHYLAXIS FOR HIV: Primary | ICD-10-CM

## 2021-08-03 DIAGNOSIS — I10 BENIGN ESSENTIAL HYPERTENSION: ICD-10-CM

## 2021-08-03 LAB
ALBUMIN SERPL-MCNC: 3.9 G/DL (ref 3.5–5)
ALP SERPL-CCNC: 98 U/L (ref 45–120)
ALT SERPL W P-5'-P-CCNC: 27 U/L (ref 0–45)
ANION GAP SERPL CALCULATED.3IONS-SCNC: 9 MMOL/L (ref 5–18)
AST SERPL W P-5'-P-CCNC: 19 U/L (ref 0–40)
BILIRUB SERPL-MCNC: 0.5 MG/DL (ref 0–1)
BUN SERPL-MCNC: 13 MG/DL (ref 8–22)
CALCIUM SERPL-MCNC: 9.4 MG/DL (ref 8.5–10.5)
CHLORIDE BLD-SCNC: 104 MMOL/L (ref 98–107)
CHOLEST SERPL-MCNC: 182 MG/DL
CO2 SERPL-SCNC: 26 MMOL/L (ref 22–31)
CREAT SERPL-MCNC: 0.99 MG/DL (ref 0.7–1.3)
FASTING STATUS PATIENT QL REPORTED: YES
GFR SERPL CREATININE-BSD FRML MDRD: 87 ML/MIN/1.73M2
GLUCOSE BLD-MCNC: 100 MG/DL (ref 70–125)
HBA1C MFR BLD: 5.1 %
HDLC SERPL-MCNC: 37 MG/DL
LDLC SERPL CALC-MCNC: 104 MG/DL
POTASSIUM BLD-SCNC: 4.5 MMOL/L (ref 3.5–5)
PROT SERPL-MCNC: 7 G/DL (ref 6–8)
PSA SERPL-MCNC: 1.58 UG/L (ref 0–3.5)
SODIUM SERPL-SCNC: 139 MMOL/L (ref 136–145)
TRIGL SERPL-MCNC: 207 MG/DL

## 2021-08-03 PROCEDURE — 80061 LIPID PANEL: CPT | Performed by: FAMILY MEDICINE

## 2021-08-03 PROCEDURE — 99396 PREV VISIT EST AGE 40-64: CPT | Performed by: FAMILY MEDICINE

## 2021-08-03 PROCEDURE — 36415 COLL VENOUS BLD VENIPUNCTURE: CPT | Performed by: FAMILY MEDICINE

## 2021-08-03 PROCEDURE — G0103 PSA SCREENING: HCPCS | Performed by: FAMILY MEDICINE

## 2021-08-03 PROCEDURE — 99213 OFFICE O/P EST LOW 20 MIN: CPT | Mod: 25 | Performed by: FAMILY MEDICINE

## 2021-08-03 PROCEDURE — 83036 HEMOGLOBIN GLYCOSYLATED A1C: CPT | Performed by: FAMILY MEDICINE

## 2021-08-03 PROCEDURE — 80053 COMPREHEN METABOLIC PANEL: CPT | Performed by: FAMILY MEDICINE

## 2021-08-03 RX ORDER — EMTRICITABINE AND TENOFOVIR DISOPROXIL FUMARATE 200; 300 MG/1; MG/1
1 TABLET, FILM COATED ORAL DAILY
Qty: 30 TABLET | Refills: 0 | Status: SHIPPED | OUTPATIENT
Start: 2021-08-03 | End: 2022-02-07

## 2021-08-03 RX ORDER — EMTRICITABINE AND TENOFOVIR DISOPROXIL FUMARATE 200; 300 MG/1; MG/1
1 TABLET, FILM COATED ORAL DAILY
Qty: 90 TABLET | Refills: 3 | Status: SHIPPED | OUTPATIENT
Start: 2021-08-03 | End: 2021-08-30

## 2021-08-03 ASSESSMENT — MIFFLIN-ST. JEOR: SCORE: 1944.2

## 2021-08-03 NOTE — PROGRESS NOTES
SUBJECTIVE:   CC: Jl Sky is an 51 year old male who presents for preventative health visit.   Chief Complaint   Patient presents with     Physical     Pt is fasting today, no questions or concerns      Patient has been advised of split billing requirements and indicates understanding: Yes  Healthy Habits:     Getting at least 3 servings of Calcium per day:  NO    Bi-annual eye exam:  Yes    Dental care twice a year:  Yes    Sleep apnea or symptoms of sleep apnea:  None    Diet:  Low fat/cholesterol    Frequency of exercise:  2-3 days/week    Duration of exercise:  Less than 15 minutes    Taking medications regularly:  Yes    Medication side effects:  Not applicable    PHQ-2 Total Score: 0    Additional concerns today:  No      Today's PHQ-2 Score:   PHQ-2 ( 1999 [employer]) 8/1/2021   Q1: Little interest or pleasure in doing things 0   Q2: Feeling down, depressed or hopeless 0   PHQ-2 Score 0   Q1: Little interest or pleasure in doing things Not at all   Q2: Feeling down, depressed or hopeless Not at all   PHQ-2 Score 0       Abuse: Current or Past(Physical, Sexual or Emotional)- No  Do you feel safe in your environment? Yes        Social History     Tobacco Use     Smoking status: Never Smoker     Smokeless tobacco: Never Used   Substance Use Topics     Alcohol use: Yes     Comment: Alcoholic Drinks/day: 3 days per week, 2 drinks at a time     If you drink alcohol do you typically have >3 drinks per day or >7 drinks per week? No    No flowsheet data found.    Last PSA:   Prostate Specific Antigen Screen   Date Value Ref Range Status   03/03/2020 1.6 0.00 - 3.50 ng/mL Final       Reviewed orders with patient. Reviewed health maintenance and updated orders accordingly - Yes  BP Readings from Last 3 Encounters:   08/03/21 (!) 151/110   07/28/21 (!) 170/104   03/03/20 (!) 165/109    Wt Readings from Last 3 Encounters:   08/03/21 103.2 kg (227 lb 9.6 oz)   07/28/21 102.8 kg (226 lb 9.6 oz)   03/03/20 104.2 kg (229  lb 12.8 oz)                  Patient Active Problem List   Diagnosis     Overweight     Hemorrhoids     Herpes Simplex Type I     Esophageal Reflux     Allergic Rhinitis     On pre-exposure prophylaxis for HIV     History reviewed. No pertinent surgical history.    Social History     Tobacco Use     Smoking status: Never Smoker     Smokeless tobacco: Never Used   Substance Use Topics     Alcohol use: Yes     Comment: Alcoholic Drinks/day: 3 days per week, 2 drinks at a time     Family History   Problem Relation Age of Onset     Hypertension Mother      Pulmonary Embolism Father      Diabetes Type 2  Father         500 lbs     Transient ischemic attack Sister          Current Outpatient Medications   Medication Sig Dispense Refill     clotrimazole (LOTRIMIN) 1 % external cream Apply topically 2 times daily for 14 days 30 g 0     emtricitabine-tenofovir, TDF, (TRUVADA) 200-300 mg per tablet [EMTRICITABINE-TENOFOVIR, TDF, (TRUVADA) 200-300 MG PER TABLET] Take 1 tablet by mouth daily. Needs appointment before additional refills. 30 tablet 0     lisinopriL (PRINIVIL,ZESTRIL) 10 MG tablet [LISINOPRIL (PRINIVIL,ZESTRIL) 10 MG TABLET] Take 1 tablet (10 mg total) by mouth daily. 90 tablet 1     omeprazole (PRILOSEC) 20 MG capsule [OMEPRAZOLE (PRILOSEC) 20 MG CAPSULE] Take 1 capsule (20 mg total) by mouth daily before breakfast. 90 capsule 2     valACYclovir (VALTREX) 1000 MG tablet [VALACYCLOVIR (VALTREX) 1000 MG TABLET] Take 2 tablets by mouth twice daily for 1 day as needed. 8 tablet 6       Reviewed and updated as needed this visit by clinical staff  Tobacco  Allergies  Meds  Problems  Med Hx  Surg Hx  Fam Hx          Reviewed and updated as needed this visit by Provider  Tobacco  Allergies  Meds  Problems  Med Hx  Surg Hx  Fam Hx         Past Medical History:   Diagnosis Date     Hypertension 2019      History reviewed. No pertinent surgical history.    Review of Systems  CONSTITUTIONAL: NEGATIVE for fever,  "chills, change in weight  INTEGUMENTARY/SKIN: NEGATIVE for worrisome rashes, moles or lesions  EYES: NEGATIVE for vision changes or irritation  ENT: NEGATIVE for ear, mouth and throat problems  RESP: NEGATIVE for significant cough or SOB  CV: NEGATIVE for chest pain, palpitations or peripheral edema  GI: NEGATIVE for nausea, abdominal pain, heartburn, or change in bowel habits   male: negative for dysuria, hematuria, decreased urinary stream, erectile dysfunction, urethral discharge  MUSCULOSKELETAL: NEGATIVE for significant arthralgias or myalgia  NEURO: NEGATIVE for weakness, dizziness or paresthesias  PSYCHIATRIC: NEGATIVE for changes in mood or affect    OBJECTIVE:   BP (!) 151/110 (BP Location: Left arm, Patient Position: Sitting, Cuff Size: Adult Large)   Pulse 62   Ht 1.867 m (6' 1.5\")   Wt 103.2 kg (227 lb 9.6 oz)   SpO2 97%   BMI 29.62 kg/m      Physical Exam  GENERAL: healthy, alert and no distress  NECK: no adenopathy, no asymmetry, masses, or scars and thyroid normal to palpation  RESP: lungs clear to auscultation - no rales, rhonchi or wheezes  CV: regular rate and rhythm, normal S1 S2, no S3 or S4, no murmur, click or rub, no peripheral edema and peripheral pulses strong  ABDOMEN: soft, nontender, no hepatosplenomegaly, no masses and bowel sounds normal  MS: no gross musculoskeletal defects noted, no edema    No results found for this or any previous visit (from the past 24 hour(s)).    ASSESSMENT/PLAN:       ICD-10-CM    1. Routine general medical examination at a health care facility  Z00.00 Comprehensive metabolic panel (BMP + Alb, Alk Phos, ALT, AST, Total. Bili, TP)     Lipid panel     PSA, screen     GLUCOSE     Hemoglobin A1c   2. Benign essential hypertension  I10    3. On pre-exposure prophylaxis for HIV  Z79.899    Medical decision making: Patient is a 51-year-old gentleman with blood pressure and on preexposure prophylaxis for HIV with Truvada who comes for a follow-up.  Blood " "pressure is noted to be slightly high.  Repeat check at the end of the visit shows persistent elevated blood pressure. Discussed increasing medication dose for lisinopril versus adding HCTZ.   At this time, he opts for monitoring it at home.  For his ME EP, he will continue to follow with ID as before.  He has been referred by ID for anal Pap smear and I have reminded him to pursue.    Patient has been advised of split billing requirements and indicates understanding: Yes  COUNSELING:   Reviewed preventive health counseling, as reflected in patient instructions       Regular exercise       Healthy diet/nutrition       PrEP retrovirus therapy for HIV prevention   Blood pressure and ASCVD score discussed today.  Patient prefers to monitor and will let me know via MyChart/E-visit regarding blood pressure at home.  Discussed adding hydrochlorothiazide 12.5 mg to regimen if most blood pressures are greater than 140/80 mmHg      Estimated body mass index is 29.62 kg/m  as calculated from the following:    Height as of this encounter: 1.867 m (6' 1.5\").    Weight as of this encounter: 103.2 kg (227 lb 9.6 oz).     Weight management plan: Discussed healthy diet and exercise guidelines    He reports that he has never smoked. He has never used smokeless tobacco.      Counseling Resources:  ATP IV Guidelines  Pooled Cohorts Equation Calculator  FRAX Risk Assessment  ICSI Preventive Guidelines  Dietary Guidelines for Americans, 2010  USDA's MyPlate  ASA Prophylaxis  Lung CA Screening    Samm Iraheta MD  [facility]  "

## 2021-08-03 NOTE — TELEPHONE ENCOUNTER
Dr Evans    Please refill: emtricitabine-tenofovir, TDF, (TRUVADA) 200-300 mg per tablet    Patient is running out of medication.    Patient states that PCP no longer does this refill and that we do now.    Please send to: [id number] - [place] [id number] [place] [id number] AT [place] [id number] & [facility]

## 2021-08-03 NOTE — PATIENT INSTRUCTIONS
Preventive Health Recommendations  Male Ages 50 - 64    Yearly exam:             See your health care provider every year in order to  o   Review health changes.   o   Discuss preventive care.    o   Review your medicines if your doctor has prescribed any.     Have a cholesterol test every 5 years, or more frequently if you are at risk for high cholesterol/heart disease.     Have a diabetes test (fasting glucose) every three years. If you are at risk for diabetes, you should have this test more often.     Have a colonoscopy at age 50, or have a yearly FIT test (stool test). These exams will check for colon cancer.      Talk with your health care provider about whether or not a prostate cancer screening test (PSA) is right for you.    You should be tested each year for STDs (sexually transmitted diseases), if you re at risk.     Shots: Get a flu shot each year. Get a tetanus shot every 10 years.     Nutrition:    Eat at least 5 servings of fruits and vegetables daily.     Eat whole-grain bread, whole-wheat pasta and brown rice instead of white grains and rice.     Get adequate Calcium and Vitamin D.     Lifestyle    Exercise for at least 150 minutes a week (30 minutes a day, 5 days a week). This will help you control your weight and prevent disease.     Limit alcohol to one drink per day.     No smoking.     Wear sunscreen to prevent skin cancer.     See your dentist every six months for an exam and cleaning.     See your eye doctor every 1 to 2 years.  Patient Education     Controlling High Blood Pressure   High blood pressure (hypertension) is often called the silent killer. This is because many people who have it, don t know it. It can be very dangerous. High blood pressure can raise your risk of heart attack, stroke, heart disease, and heart failure. Controlling your blood pressure can decrease your risk of these problems. It's important to know the appropriate blood pressure range and remember to check your  blood pressure regularly. Doing so can save your life.   Blood pressure measurements are given as 2 numbers. Systolic blood pressure is the upper number. This is the pressure when the heart contracts. Diastolic blood pressure is the lower number. This is the pressure when the heart relaxes between beats.   Blood pressure is categorized as normal, elevated, or stage 1 or stage 2 high blood pressure:     Normal blood pressure is systolic of less than 120 and diastolic of less than 80 (120/80)    Elevated blood pressure is systolic of 120 to 129 and diastolic less than 80    Stage 1 high blood pressure is systolic of 130 to 139 or diastolic between 80 to 89    Stage 2 high blood pressure is when systolic is 140 or higher or the diastolic is 90 or higher  A heart-healthy lifestyle can help you control your blood pressure without medicines. Here are some things you can do to pursue a heart-healthy lifestyle:     Choose heart-healthy foods     Select low-salt, low-fat foods. Limit sodium intake to 2,400 mg per day or the amount suggested by your healthcare provider.    Limit canned, dried, cured, packaged, and fast foods. These can contain a lot of salt.    Eat 8 to 10 servings of fruits and vegetables every day.    Choose lean meats, fish, or chicken.    Eat whole-grain pasta, brown rice, and beans.    Eat 2 to 3 servings of low-fat or fat-free dairy products.    Ask your doctor about the DASH eating plan. This plan helps reduce blood pressure.    When you go to a restaurant, ask that your meal be prepared with no added salt.    Stay at a healthy weight     Ask your healthcare provider how many calories to eat a day. Then stick to that number.    Ask your healthcare provider what weight range is healthiest for you. If you are overweight, a weight loss of only 3% to 5% of your body weight can help lower blood pressure. Generally, a good weight loss goal is to lose 10% of your body weight in a year.    Limit snacks and  sweets.    Get regular exercise.    Get up and get active     Find activities you enjoy that can be done alone or with friends or family. Such activities might include bicycling, dancing, walking, or jogging.    Park farther away from building entrances to walk more.    Use stairs instead of the elevator.    When you can, walk or bike instead of driving.    Oark leaves, garden, or do household repairs.    Be active at a moderate to vigorous level of physical activity for at least 40 minutes for a minimum of 3 to 4 days a week.     Manage stress     Make time to relax and enjoy life. Find time to laugh.    Communicate your concerns with your loved ones and your healthcare provider.    Visit with family and friends, and keep up with hobbies.    Limit alcohol and quit smoking     Men should have no more than 2 drinks per day.    Women should have no more than 1 drink per day.    Talk with your healthcare provider about quitting smoking. Smoking significantly increases your risk for heart disease and stroke. Ask your healthcare provider about community smoking cessation programs and other options.    Medicines  If lifestyle changes aren t enough, your healthcare provider may prescribe high blood pressure medicine. Take all medicines as prescribed. If you have any questions about your medicines, ask your healthcare provider before stopping or changing them.   [employer] last reviewed this educational content on 6/1/2019 2000-2021 [employer]. All rights reserved. This information is not intended as a substitute for professional medical care. Always follow your healthcare professional's instructions.

## 2021-08-19 NOTE — PROGRESS NOTES
[facility] Inclusion/Exclusion Criteria:     Study Name: [facility]   : Mustapha Donald MD    Protocol version: 2.0 dated 08Jun2021    Criteria #  Inclusion Criterita (ALL MUST BE YES)  YES/NO    1   At least 22 years old at the time of screening.     2   Able to read and understand the written informed consent form    3   Willing and able to participate in the study procedures and comply with its     restrictions for the duration of the study.  No   4   Able to communicate effectively with and follow instructions from the study staff    5   Left and right upper arm circumference (UAC), measured at the midpoint between the armpit and inner elbow, is ? 25 cm and  ? 43 cm.    6   Left and right wrist circumference is within 130 to 245 mm inclusive.     7   Left and right upper arm length, as measured by the armpit to the inner elbow, is the correct length to wear the BP cuff(s).     For those with UAC  ? 34 cm, upper arm length muse be ?  14.5 cm.     For those with UAC > 34 cm, upper arm length must be ?  17 cm.     8   If COVID-19 diagnosis is part of the medical history, subject must be >30 days and recovered from symptoms.     9   Cohort 2 only: Pregnant confirmed via physician's report with estimated due date to verify gestational age of 20-37 weeks.         Criteria # Exclusion Criteria (ALL MUST BE NO) YES/NO   1   Incapable or unwilling to have multiple BP measurements taken sequentially or simultaneously at the left and right upper arms    2   Upper body electromagnetically conductive implantable (I.e. pacemaker, implantable cardioverter-defibrillator, etc.)     3   Known significant sensitivity or allergy to medical adhesives, isopropyl alcohol, or electrocardiogram (ECG) electrodes.    4   A current or history of a condition which has theoretical or clinical evidence that might affect, interfere with, prevent, or is not suitable for blood pressure measurements at upper arm. Such conditions  include but are not limited to:     Significantly irregular heart rhythm as determined by the investigator, including: bigeminy, trigeminy, frequent (more than 3 per minute) ectopic beats, atrial fibrillation, atrial flutter.     Peripheral artery disease     Absence of palpable brachial artery pulses on the left or right arm     Difficulties in obtaining reliable auscultatory BP measurements (e.g. due to faint or unclear Korotkoff sounds, including K5).    Korotkoff sounds which are audible very close to 0 mmHg level or even after complete deflation of cuff during brachial BP measurement.     Arteriovenous fistula (such as used in hemodialysis) or any other intravenous acces device on either arm.     Lymphedema on either arm.     Axillary lymph node biopsy or resection, or a radical mastectomy.     Current rash, guaze/ adhesive dressings, casts, open sores, hematoma, trauma, edema, wounds, or deformation on either arm.     Current carpal tunnel syndrome or ulnar tunnel syndrome of either arm.     Chronic or acute pain affecting either arm or hand.     5   Subjects with any Medical History, Physical exam, vital sign or any other study procedure finding/ assessment that in the opinion of the PI or designee could compromise subject safety objectives.     6   Occupational exclusion: Subjects who work for a company that develops or sells medical/fitness devices (e.g. ECG monitors, wearable fitness bands, sleep monitors, etc.) or a technology focused media company (e.g. , professional bloggers, TV, magazine, newspaper reporters, etc.) will be excluded from the study.     7   Cohort 1 only:     Pregnant women    Average of 2nd and 3rd screening measurement blood pressure lateral difference of ? 16 mmHg systolic and ?  11 mmHg diastolic at the time of screening     Weight greater than ? 182 kilograms.     8   Cohort 2 only:     Condition at the time of screening:   o Screening SBP ? 141 mmHg or DBP ? 91 mmHg    o Cervical dilation   o Calculated BML ? 30 from pre-pregnancy weight (self-reported) and screening height   o Left or right UAC ? 32 cm     A current or history of a condition which has theoretical or clinical evidence that might place the current pregnancy at a higher risk. Such conditions include but are not limited to:   o Hypertension   o  birth   o  labor ( contractions plus cervical dilation)  o Bleeding in second or third trimester   o Medical complication in pregnancy   o Requiring delivery prior to 37 weeks         9   Subjects that meet any of the following criteria at the time of study screening:     Age ? 75    BMI ? 45 (Cohort 1)     Serious heart conditions, such as heart failure, coronary artery disease, or cardiomyopathies UNLESS fully vaccinated for COVID-19*    Any acute or chronic respiratory conditions or moderate to severe asthma UNLESS fully vaccinated for COVID-19*    Chronic lung or kidney disease UNLESS fully vaccinated for COVID-19*    Diagnosis of diabetes mellitus UNLESS fully vaccinated for COVID-19*    Diagnosis of cancer UNLESS fully vaccinated for COVID-19* AND meet one of the following criteria  o Previously treated solid cancers (>12 mo ago) in remission that are not actively being treated  o Non-metastatic prostate cancer not currently undergoing treatment  o Squamous or basal cell carcinoma      Neither subject, nor any individuals living with subject, have had new development in the following within the last 14 days prior to study screening:     Sickle cell disease     Taking any medication(s) that could lead to a compromise immune system     Any medical condition that leads to a compromised immune system     Neither subject, nor any individuals living with subject, have had new development in the following within the last 14 days prior to study screening:   o Traveled outside of country of study site   o Have had any unexpected flu-like symptoms (such as  fever, chills, cough, shortness of breath, diarrhea, sore throat, runny nose, or trouble breathing)   o Have had any contact with people with confirmed COVID-19   o Have been confirmed to have COVID-19 and have no subsequently received a negative COVID-19 test result.     *Fully vaccinated is defined as ? 2 weeks after a final COVID-19 vaccine dose      Patient cancelled their 12-Jul-21 appointment and declined to reschedule.     Mustapha Donald MD

## 2021-08-30 ENCOUNTER — OFFICE VISIT (OUTPATIENT)
Dept: INFECTIOUS DISEASES | Facility: CLINIC | Age: 51
End: 2021-08-30
Payer: COMMERCIAL

## 2021-08-30 VITALS
DIASTOLIC BLOOD PRESSURE: 100 MMHG | WEIGHT: 225.9 LBS | BODY MASS INDEX: 29.4 KG/M2 | HEART RATE: 72 BPM | SYSTOLIC BLOOD PRESSURE: 150 MMHG

## 2021-08-30 DIAGNOSIS — Z79.899 ON PRE-EXPOSURE PROPHYLAXIS FOR HIV: ICD-10-CM

## 2021-08-30 PROCEDURE — 99213 OFFICE O/P EST LOW 20 MIN: CPT

## 2021-08-30 RX ORDER — EMTRICITABINE AND TENOFOVIR DISOPROXIL FUMARATE 200; 300 MG/1; MG/1
1 TABLET, FILM COATED ORAL DAILY
Qty: 93 TABLET | Refills: 3 | Status: SHIPPED | OUTPATIENT
Start: 2021-08-30 | End: 2022-08-29

## 2021-08-30 NOTE — PROGRESS NOTES
Patient is a 50-year-old gentleman referred by Samm Iraheta MD for HIV preexposure prophylaxis counseling     Patient was seen via video visit about a year ago.    Patient is currently not in a monogamous relationship.  He was started on Truvada about a year ago, which he is tolerating well.    He was diagnosed with gonorrhea in January.  He was started on lisinopril in March.    We discussed the risks and benefits of true beta including that it protects against Truvada susceptible strains of HIV which constitutes most of the strain circulating, but not all of them.    We discussed that condoms are better protection against other sexually transmitted infections and Truvada does not protect against any other sexually transmitted infections.    There was also some question about anal Pap smears, which are indicated for men having sex with men.  I did make referral to colorectal surgery for those Pap smears.    Follow-up visit on August 30, 2021:    Patient states he is doing well.  He says he has not been sexually active in many months.  He has many questions about starting and stopping true beta for preexposure prophylaxis.      GENERAL: Healthy, alert and no distress  EYES: Eyes grossly normal to inspection. No discharge or erythema, or obvious scleral/conjunctival abnormalities.  RESP: Normal respiratory pattern  NEURO: Cranial nerves grossly intact. Mentation and speech appropriate for age.  PSYCH: Mentation appears normal, affect normal/bright, judgement and insight intact, normal speech and appearance well-groomed    Impression: Sexually active man who has sex with other men.    Past infection with gonorrhea      Recommendations: Truvada is still appropriate.  We discussed the benefit of taking it every day as opposed to taking it prior to sexual activity.      Will refer to colorectal surgery for screening for HPV and anal carcinoma.    Quarterly blood test for HIV screening and LFTs are ordered.    Can  return in 1 year for follow-up regarding continued treatment.    Norris Evans MD

## 2021-09-23 DIAGNOSIS — I10 BENIGN ESSENTIAL HYPERTENSION: ICD-10-CM

## 2021-09-23 RX ORDER — LISINOPRIL 10 MG/1
10 TABLET ORAL DAILY
Qty: 90 TABLET | Refills: 1 | Status: SHIPPED | OUTPATIENT
Start: 2021-09-23 | End: 2022-03-22

## 2021-10-01 DIAGNOSIS — B00.9 HERPES SIMPLEX VIRUS (HSV) INFECTION: ICD-10-CM

## 2021-10-02 RX ORDER — VALACYCLOVIR HYDROCHLORIDE 1 G/1
TABLET, FILM COATED ORAL
Qty: 8 TABLET | Refills: 6 | Status: SHIPPED | OUTPATIENT
Start: 2021-10-02 | End: 2022-10-27

## 2021-10-02 NOTE — TELEPHONE ENCOUNTER
"  Last office visit provider:  7/1/21     Requested Prescriptions   Pending Prescriptions Disp Refills     valACYclovir (VALTREX) 1000 mg tablet 8 tablet 6     Sig: [VALACYCLOVIR (VALTREX) 1000 MG TABLET] Take 2 tablets by mouth twice daily for 1 day as needed.       Antivirals for Herpes Protocol Passed - 10/1/2021  4:26 PM        Passed - Patient is age 12 or older        Passed - Recent (12 mo) or future (30 days) visit within the authorizing provider's specialty     Patient has had an office visit with the authorizing provider or a provider within the authorizing providers department within the previous 12 mos or has a future within next 30 days. See \"Patient Info\" tab in inbasket, or \"Choose Columns\" in Meds & Orders section of the refill encounter.              Passed - Medication is active on med list        Passed - Normal serum creatinine on file in past 12 months     Recent Labs   Lab Test 08/03/21  0830   CR 0.99       Ok to refill medication if creatinine is low               alley wells RN 10/02/21 5:41 PM  "

## 2021-10-05 ENCOUNTER — IMMUNIZATION (OUTPATIENT)
Dept: FAMILY MEDICINE | Facility: CLINIC | Age: 51
End: 2021-10-05
Payer: COMMERCIAL

## 2021-10-05 PROCEDURE — 90471 IMMUNIZATION ADMIN: CPT

## 2021-10-05 PROCEDURE — 90682 RIV4 VACC RECOMBINANT DNA IM: CPT

## 2021-11-08 ENCOUNTER — LAB (OUTPATIENT)
Dept: LAB | Facility: CLINIC | Age: 51
End: 2021-11-08
Payer: COMMERCIAL

## 2021-11-08 DIAGNOSIS — Z79.899 ON PRE-EXPOSURE PROPHYLAXIS FOR HIV: ICD-10-CM

## 2021-11-08 LAB
ANION GAP SERPL CALCULATED.3IONS-SCNC: 12 MMOL/L (ref 5–18)
BUN SERPL-MCNC: 16 MG/DL (ref 8–22)
CALCIUM SERPL-MCNC: 9.8 MG/DL (ref 8.5–10.5)
CHLORIDE BLD-SCNC: 106 MMOL/L (ref 98–107)
CO2 SERPL-SCNC: 24 MMOL/L (ref 22–31)
CREAT SERPL-MCNC: 1.26 MG/DL (ref 0.7–1.3)
GFR SERPL CREATININE-BSD FRML MDRD: 65 ML/MIN/1.73M2
GLUCOSE BLD-MCNC: 100 MG/DL (ref 70–125)
HIV 1+2 AB+HIV1 P24 AG SERPL QL IA: NEGATIVE
POTASSIUM BLD-SCNC: 5 MMOL/L (ref 3.5–5)
SODIUM SERPL-SCNC: 142 MMOL/L (ref 136–145)

## 2021-11-08 PROCEDURE — 80048 BASIC METABOLIC PNL TOTAL CA: CPT

## 2021-11-08 PROCEDURE — 36415 COLL VENOUS BLD VENIPUNCTURE: CPT

## 2021-11-08 PROCEDURE — 87389 HIV-1 AG W/HIV-1&-2 AB AG IA: CPT

## 2022-01-03 ENCOUNTER — E-VISIT (OUTPATIENT)
Dept: FAMILY MEDICINE | Facility: CLINIC | Age: 52
End: 2022-01-03
Payer: COMMERCIAL

## 2022-01-03 DIAGNOSIS — I10 BENIGN ESSENTIAL HYPERTENSION: Primary | ICD-10-CM

## 2022-01-03 PROCEDURE — 99207 PR NON-BILLABLE SERV PER CHARTING: CPT | Performed by: FAMILY MEDICINE

## 2022-01-03 NOTE — PATIENT INSTRUCTIONS
Bacilio Parikh,    I reviewed your recent messages with Dr. WOLF.  She mentioned scheduling a telehealth visit which would mean a telephone or video visit with a physician.  E visits are reserved for simple things that do not require ongoing conversation.  I will ask our nurses to reach out to you to help schedule this to further discuss your blood pressures.    You won t be charged for this eVisit.

## 2022-02-07 ENCOUNTER — OFFICE VISIT (OUTPATIENT)
Dept: FAMILY MEDICINE | Facility: CLINIC | Age: 52
End: 2022-02-07
Payer: COMMERCIAL

## 2022-02-07 VITALS
BODY MASS INDEX: 29.26 KG/M2 | HEART RATE: 60 BPM | DIASTOLIC BLOOD PRESSURE: 110 MMHG | HEIGHT: 74 IN | SYSTOLIC BLOOD PRESSURE: 153 MMHG | OXYGEN SATURATION: 99 % | WEIGHT: 228 LBS

## 2022-02-07 DIAGNOSIS — E87.5 SERUM POTASSIUM ELEVATED: ICD-10-CM

## 2022-02-07 DIAGNOSIS — K21.9 GASTROESOPHAGEAL REFLUX DISEASE, UNSPECIFIED WHETHER ESOPHAGITIS PRESENT: ICD-10-CM

## 2022-02-07 DIAGNOSIS — I10 BENIGN ESSENTIAL HYPERTENSION: Primary | ICD-10-CM

## 2022-02-07 PROCEDURE — 99214 OFFICE O/P EST MOD 30 MIN: CPT | Performed by: FAMILY MEDICINE

## 2022-02-07 RX ORDER — HYDROCHLOROTHIAZIDE 12.5 MG/1
12.5 TABLET ORAL DAILY
Qty: 90 TABLET | Refills: 3 | Status: SHIPPED | OUTPATIENT
Start: 2022-02-07 | End: 2023-02-02

## 2022-02-07 ASSESSMENT — MIFFLIN-ST. JEOR: SCORE: 1946.01

## 2022-02-07 NOTE — PATIENT INSTRUCTIONS
Patient Education     GERD (Adult)    The esophagus is a tube that carries food from the mouth to the stomach. A valve (the LES, lower esophageal sphincter) at the lower end of the esophagus prevents stomach acid from flowing upward. When this valve doesn't work properly, stomach contents may repeatedly flow back up (reflux) into the esophagus. This is called gastroesophageal reflux disease (GERD). GERD can irritate the esophagus. It can cause problems with pain, swallowing or breathing. In severe cases, GERD can cause recurrent pneumonia (from aspiration or breathing in particles) or other serious problems.  Symptoms of reflux include burning, pressure or sharp pain in the upper abdomen or mid to lower chest. The pain can spread to the neck, back, or shoulder. There may be belching, an acid taste in the back of the throat, chronic cough, or sore throat, or hoarseness. GERD symptoms often occur during the day after a big meal. They can also occur at night when lying down.   Home care  Lifestyle changes can help reduce symptoms. If needed, your healthcare provider may prescribe medicines. Symptoms often improve with treatment, but if treatment is stopped, the symptoms often return after a few months. So most persons with GERD will need to continue treatment or get treatment on and off.  Lifestyle changes    Limit or avoid fatty, fried, and spicy foods, as well as coffee, chocolate, mint, and foods with high acid content such as tomatoes and citrus fruit and juices (orange, grapefruit, lemon).    Don t eat large meals, especially at night. Frequent, smaller meals are best. Don't lie down right after eating. And don t eat anything 3 hours before going to bed.    Don't drink alcohol or smoke. As much as possible, stay away from second hand smoke.    If you are overweight, losing weight will reduce symptoms.     Don't wear tight clothing around your stomach area.    If your symptoms occur during sleep, use a foam wedge  "to elevate your upper body (not just your head.) Or, place 4\" blocks under the head of your bed. Or use 2 bed risers under your bedframe.  Medicines  If needed, medicines can help relieve the symptoms of GERD and prevent damage to the esophagus. Discuss a medicine plan with your healthcare provider. This may include one or more of the following medicines:    Antacids to help neutralize the normal acids in your stomach.    Acid blockers (Histamine or H2 blockers) to decrease acid production.    Acid inhibitors (proton pump inhibitors PPIs) to decrease acid production in a different way than the blockers. They may work better, but can take a little longer to take effect.  Take an antacid 30 to 60 minutes after eating and at bedtime, but not at the same time as an acid blocker.  Try not to take medicines such as ibuprofen and aspirin. If you are taking aspirin for your heart or other medical reasons, talk to your healthcare provider about stopping it.  Follow-up care  Follow up with your healthcare provider or as advised by our staff.  When to seek medical advice  Call your healthcare provider if any of the following occur:    Stomach pain gets worse or moves to the lower right abdomen (appendix area)    Chest pain appears or gets worse, or spreads to the back, neck, shoulder, or arm    An over-the-counter trial of medicine doesn't relieve your symptoms    Weight loss that can't be explained    Trouble or pain swallowing    Frequent vomiting (can t keep down liquids)    Blood in the stool or vomit (red or black in color)    Feeling weak or dizzy    Fever of 100.4 F (38 C) or higher, or as directed by your healthcare provider  [employer] last reviewed this educational content on 3/1/2018    7008-2237 [employer]. All rights reserved. This information is not intended as a substitute for professional medical care. Always follow your healthcare professional's instructions.           "

## 2022-02-07 NOTE — PROGRESS NOTES
"  Assessment & Plan     ICD-10-CM    1. Benign essential hypertension  I10 hydrochlorothiazide (HYDRODIURIL) 12.5 MG tablet     Basic metabolic panel  (Ca, Cl, CO2, Creat, Gluc, K, Na, BUN)   2. Gastroesophageal reflux disease, unspecified whether esophagitis present  K21.9 omeprazole (PRILOSEC) 20 MG DR capsule   3. Serum potassium elevated  E87.5      Medical decision making: Patient with elevated blood pressure with essential hypertension presents today for follow-up.  He has had borderline potassium in the past on the higher side.  We discussed that addition of a diuretic like HCTZ can help control the potassium and bring the blood pressure down.  Next step would be to raise HCTZ to 25 to achieve ideal goals if not optimal.  Patient will send a [employer] message in about 4 to 6 weeks.  He will RTC for electrolyte check in a couple of weeks after starting medication.  He will seek help sooner if he has any concerns.  Discussed that indefinite use of PPIs not recommended.  Patient has not thought about weaning off.  I have asked him to try every other day or use it on as-needed basis to see.  If worsening of symptoms we can always do a GI consult versus endoscopy.  Patient verbalizes understanding.       BMI:   Estimated body mass index is 29.67 kg/m  as calculated from the following:    Height as of this encounter: 1.867 m (6' 1.5\").    Weight as of this encounter: 103.4 kg (228 lb).   Weight management plan: Discussed healthy diet and exercise guidelines    MEDICATIONS:   Orders Placed This Encounter   Medications     omeprazole (PRILOSEC) 20 MG DR capsule     Sig: Take 1 capsule (20 mg) by mouth daily before breakfast     Dispense:  90 capsule     Refill:  1     hydrochlorothiazide (HYDRODIURIL) 12.5 MG tablet     Sig: Take 1 tablet (12.5 mg) by mouth daily     Dispense:  90 tablet     Refill:  3          - Continue other medications without change  Work on weight loss  Regular exercise  See Patient " "Instructions    Return in about 6 months (around 8/7/2022) for Routine preventive, Lab test in 2 weeks for pottasium check.    Samm Iraheta MD  [facility]    Subjective    Chief Complaint   Patient presents with     Hypertension     Here for blood pressure, pt states he has been feeling good, has been running high, pt has been checking it at home- still running high. Currently on 10mg of Lisinopril, no side effects from high blood pressure       Jl is a 51 year old who presents for the following health issues     HPI: Home blood pressure has been 1 40-1 60 and greater systolic and greater than 90 diastolic.  He indicates that he is feeling well and   denies any symptoms referable to elevated blood pressure.   Specifically denies chest pain, palpitations, dyspnea, orthopnea,   PND or peripheral edema    Has been using omeprazole for acid for last 5 years.  Has not tried to stop in between to see if symptoms are persisting.  No history of endoscopy.  Up-to-date with colonoscopy.      Patient Active Problem List   Diagnosis     Overweight     Hemorrhoids     Herpes Simplex Type I     Esophageal Reflux     Allergic Rhinitis     On pre-exposure prophylaxis for HIV     Current Outpatient Medications   Medication     emtricitabine-tenofovir (TRUVADA) 200-300 MG per tablet     hydrochlorothiazide (HYDRODIURIL) 12.5 MG tablet     lisinopril (ZESTRIL) 10 MG tablet     omeprazole (PRILOSEC) 20 MG DR capsule     valACYclovir (VALTREX) 1000 mg tablet     No current facility-administered medications for this visit.         Review of Systems   Constitutional, HEENT, cardiovascular, pulmonary, gi and gu systems are negative, except as otherwise noted.      Objective    BP (!) 153/110   Pulse 60   Ht 1.867 m (6' 1.5\")   Wt 103.4 kg (228 lb)   SpO2 99%   BMI 29.67 kg/m    Body mass index is 29.67 kg/m .  Physical Exam   GENERAL: healthy, alert and no distress    Lab on 11/08/2021   Component " Date Value Ref Range Status     Sodium 11/08/2021 142  136 - 145 mmol/L Final     Potassium 11/08/2021 5.0  3.5 - 5.0 mmol/L Final     Chloride 11/08/2021 106  98 - 107 mmol/L Final     Carbon Dioxide (CO2) 11/08/2021 24  22 - 31 mmol/L Final     Anion Gap 11/08/2021 12  5 - 18 mmol/L Final     Urea Nitrogen 11/08/2021 16  8 - 22 mg/dL Final     Creatinine 11/08/2021 1.26  0.70 - 1.30 mg/dL Final     Calcium 11/08/2021 9.8  8.5 - 10.5 mg/dL Final     Glucose 11/08/2021 100  70 - 125 mg/dL Final     GFR Estimate 11/08/2021 65  >60 mL/min/1.73m2 Final    As of July 11, 2021, eGFR is calculated by the CKD-EPI creatinine equation, without race adjustment. eGFR can be influenced by muscle mass, exercise, and diet. The reported eGFR is an estimation only and is only applicable if the renal function is stable.     HIV Antigen Antibody Combo 11/08/2021 Negative  Negative Final

## 2022-03-02 ENCOUNTER — LAB (OUTPATIENT)
Dept: LAB | Facility: CLINIC | Age: 52
End: 2022-03-02
Payer: COMMERCIAL

## 2022-03-02 DIAGNOSIS — I10 BENIGN ESSENTIAL HYPERTENSION: ICD-10-CM

## 2022-03-02 LAB
ANION GAP SERPL CALCULATED.3IONS-SCNC: 8 MMOL/L (ref 5–18)
BUN SERPL-MCNC: 19 MG/DL (ref 8–22)
CALCIUM SERPL-MCNC: 9.4 MG/DL (ref 8.5–10.5)
CHLORIDE BLD-SCNC: 104 MMOL/L (ref 98–107)
CO2 SERPL-SCNC: 29 MMOL/L (ref 22–31)
CREAT SERPL-MCNC: 1.06 MG/DL (ref 0.7–1.3)
GFR SERPL CREATININE-BSD FRML MDRD: 84 ML/MIN/1.73M2
GLUCOSE BLD-MCNC: 91 MG/DL (ref 70–125)
POTASSIUM BLD-SCNC: 4.5 MMOL/L (ref 3.5–5)
SODIUM SERPL-SCNC: 141 MMOL/L (ref 136–145)

## 2022-03-02 PROCEDURE — 80048 BASIC METABOLIC PNL TOTAL CA: CPT

## 2022-03-02 PROCEDURE — 36415 COLL VENOUS BLD VENIPUNCTURE: CPT

## 2022-03-21 DIAGNOSIS — I10 BENIGN ESSENTIAL HYPERTENSION: ICD-10-CM

## 2022-03-22 RX ORDER — LISINOPRIL 10 MG/1
10 TABLET ORAL DAILY
Qty: 90 TABLET | Refills: 1 | Status: SHIPPED | OUTPATIENT
Start: 2022-03-22 | End: 2022-08-05

## 2022-03-22 NOTE — TELEPHONE ENCOUNTER
"Routing refill request to provider for review/approval because:  Labs out of range:  BP elevated    Last Written Prescription Date:  9/23/21  Last Fill Quantity: 90,  # refills: 1   Last office visit provider:   2/7/22    Requested Prescriptions   Pending Prescriptions Disp Refills     lisinopril (ZESTRIL) 10 MG tablet 90 tablet 1     Sig: Take 1 tablet (10 mg) by mouth daily       ACE Inhibitors (Including Combos) Protocol Failed - 3/21/2022  5:24 PM        Failed - Blood pressure under 140/90 in past 12 months     BP Readings from Last 3 Encounters:   02/07/22 (!) 153/110   08/30/21 (!) 150/100   08/03/21 (!) 151/110                 Passed - Recent (12 mo) or future (30 days) visit within the authorizing provider's specialty     Patient has had an office visit with the authorizing provider or a provider within the authorizing providers department within the previous 12 mos or has a future within next 30 days. See \"Patient Info\" tab in inbasket, or \"Choose Columns\" in Meds & Orders section of the refill encounter.              Passed - Medication is active on med list        Passed - Patient is age 18 or older        Passed - Normal serum creatinine on file in past 12 months     Recent Labs   Lab Test 03/02/22  0701   CR 1.06       Ok to refill medication if creatinine is low          Passed - Normal serum potassium on file in past 12 months     Recent Labs   Lab Test 03/02/22  0701   POTASSIUM 4.5                  Cata Lopez RN 03/22/22 3:09 PM  "

## 2022-08-04 DIAGNOSIS — K21.9 GASTROESOPHAGEAL REFLUX DISEASE, UNSPECIFIED WHETHER ESOPHAGITIS PRESENT: ICD-10-CM

## 2022-08-04 DIAGNOSIS — I10 BENIGN ESSENTIAL HYPERTENSION: ICD-10-CM

## 2022-08-05 RX ORDER — LISINOPRIL 10 MG/1
10 TABLET ORAL DAILY
Qty: 90 TABLET | Refills: 1 | Status: SHIPPED | OUTPATIENT
Start: 2022-08-05 | End: 2023-02-02

## 2022-08-05 NOTE — TELEPHONE ENCOUNTER
"Routing refill request to provider for review/approval because:  Drug not on the [facility] refill protocol  Early refill request     Last office visit provider:  2/7/2022 Dr. Iraheta     Omeprazole Last Written Prescription Date:  2/7/2022  Last Fill Quantity: 90,  # refills: 1     Lisinopril Last Written Prescription Date:  3/22/2022  Last Fill Quantity: 90,  # refills: 1     Requested Prescriptions   Pending Prescriptions Disp Refills     omeprazole (PRILOSEC) 20 MG DR capsule 90 capsule 1     Sig: Take 1 capsule (20 mg) by mouth daily before breakfast       PPI Protocol Passed - 8/4/2022  3:34 PM        Passed - Not on Clopidogrel (unless Pantoprazole ordered)        Passed - No diagnosis of osteoporosis on record        Passed - Recent (12 mo) or future (30 days) visit within the authorizing provider's specialty     Patient has had an office visit with the authorizing provider or a provider within the authorizing providers department within the previous 12 mos or has a future within next 30 days. See \"Patient Info\" tab in inbasket, or \"Choose Columns\" in Meds & Orders section of the refill encounter.              Passed - Medication is active on med list        Passed - Patient is age 18 or older           lisinopril (ZESTRIL) 10 MG tablet 90 tablet 1     Sig: Take 1 tablet (10 mg) by mouth daily       There is no refill protocol information for this order          Gertrudis Cheung RN 08/05/22 2:56 PM  "

## 2022-08-29 ENCOUNTER — OFFICE VISIT (OUTPATIENT)
Dept: INFECTIOUS DISEASES | Facility: CLINIC | Age: 52
End: 2022-08-29
Payer: COMMERCIAL

## 2022-08-29 VITALS
DIASTOLIC BLOOD PRESSURE: 112 MMHG | TEMPERATURE: 98 F | SYSTOLIC BLOOD PRESSURE: 158 MMHG | BODY MASS INDEX: 29.8 KG/M2 | HEART RATE: 68 BPM | WEIGHT: 229 LBS

## 2022-08-29 DIAGNOSIS — Z79.899 ON PRE-EXPOSURE PROPHYLAXIS FOR HIV: ICD-10-CM

## 2022-08-29 LAB
CHOLEST SERPL-MCNC: 229 MG/DL
HDLC SERPL-MCNC: 47 MG/DL
LDLC SERPL CALC-MCNC: 142 MG/DL
NONHDLC SERPL-MCNC: 182 MG/DL
TRIGL SERPL-MCNC: 202 MG/DL

## 2022-08-29 PROCEDURE — 99214 OFFICE O/P EST MOD 30 MIN: CPT

## 2022-08-29 PROCEDURE — 80061 LIPID PANEL: CPT

## 2022-08-29 PROCEDURE — 36415 COLL VENOUS BLD VENIPUNCTURE: CPT

## 2022-08-29 RX ORDER — CX-024414 0.2 MG/ML
INJECTION, SUSPENSION INTRAMUSCULAR
COMMUNITY
Start: 2022-04-26 | End: 2022-08-29

## 2022-08-29 RX ORDER — EMTRICITABINE AND TENOFOVIR DISOPROXIL FUMARATE 200; 300 MG/1; MG/1
1 TABLET, FILM COATED ORAL DAILY
Qty: 93 TABLET | Refills: 3 | Status: SHIPPED | OUTPATIENT
Start: 2022-08-29 | End: 2023-08-25

## 2022-08-29 NOTE — PROGRESS NOTES
Patient is a 50-year-old gentleman referred by Samm Iraheta MD for HIV preexposure prophylaxis counseling     Patient was seen via video visit about a year ago.    Patient is currently not in a monogamous relationship.  He was started on Truvada about a year ago, which he is tolerating well.    He was diagnosed with gonorrhea in January.  He was started on lisinopril in March.    We discussed the risks and benefits of true beta including that it protects against Truvada susceptible strains of HIV which constitutes most of the strain circulating, but not all of them.    We discussed that condoms are better protection against other sexually transmitted infections and Truvada does not protect against any other sexually transmitted infections.    There was also some question about anal Pap smears, which are indicated for men having sex with men.  I did make referral to colorectal surgery for those Pap smears.    Follow-up visit on August 30, 2021:    Patient states he is doing well.  He says he has not been sexually active in many months.  He has many questions about starting and stopping true beta for preexposure prophylaxis.    Follow-up visit on August 29, 2022:    Patient is doing generally well with his medications.  His blood pressure is a bit high.    Has not been getting his every 3-month HIV test or STD test.  Make sure those are done.    We did discuss cabotegravir, but as he is on daily medications for his hypertension, we could keep him on his Garima.    We also discussed monkey box.  He is not meet the current criteria for administration of the vaccine.  However, should the vaccine becomes generally available, I would recommend it for him.    He states he is not very active sexually, last encounter was a month ago.      GENERAL: Healthy, alert and no distress  EYES: Eyes grossly normal to inspection. No discharge or erythema, or obvious scleral/conjunctival abnormalities.  RESP: Normal respiratory  pattern  NEURO: Cranial nerves grossly intact. Mentation and speech appropriate for age.  PSYCH: Mentation appears normal, affect normal/bright, judgement and insight intact, normal speech and appearance well-groomed    Impression: Sexually active man who has sex with other men.    Past infection with gonorrhea      Recommendations: Truvada is still appropriate.  We discussed the benefit of taking it every day as opposed to taking it prior to sexual activity.    Orders Placed This Encounter   Procedures     HIV Antigen Antibody Combo     Standing Status:   Standing     Number of Occurrences:   4     Standing Expiration Date:   8/29/2023     Basic metabolic panel  (Ca, Cl, CO2, Creat, Gluc, K, Na, BUN)     Standing Status:   Standing     Number of Occurrences:   4     Standing Expiration Date:   8/29/2023     Treponema Abs w Reflex to RPR and Titer     Standing Status:   Standing     Number of Occurrences:   4     Standing Expiration Date:   8/29/2023     Lipid Profile (Chol, Trig, HDL, LDL calc)     Standing Status:   Future     Number of Occurrences:   1     Standing Expiration Date:   8/29/2023     Order Specific Question:   Perform labs while fasting or non-fasting?     Answer:   Fasting     NEISSERIA GONORRHOEA PCR     Standing Status:   Standing     Number of Occurrences:   4     Standing Expiration Date:   8/29/2023     CHLAMYDIA TRACHOMATIS PCR     Standing Status:   Standing     Number of Occurrences:   4     Standing Expiration Date:   8/29/2023         Norris Evans MD

## 2022-09-05 ASSESSMENT — ENCOUNTER SYMPTOMS
HEARTBURN: 0
HEADACHES: 0
PALPITATIONS: 0
WEAKNESS: 0
EYE PAIN: 0
COUGH: 0
HEMATOCHEZIA: 0
MYALGIAS: 0
DIARRHEA: 0
PARESTHESIAS: 0
CONSTIPATION: 0
CHILLS: 0
FREQUENCY: 0
NAUSEA: 0
JOINT SWELLING: 0
DYSURIA: 0
HEMATURIA: 0
NERVOUS/ANXIOUS: 0
FEVER: 0
SORE THROAT: 0
ABDOMINAL PAIN: 0
SHORTNESS OF BREATH: 0
ARTHRALGIAS: 0
DIZZINESS: 0

## 2022-09-09 ENCOUNTER — OFFICE VISIT (OUTPATIENT)
Dept: FAMILY MEDICINE | Facility: CLINIC | Age: 52
End: 2022-09-09
Payer: COMMERCIAL

## 2022-09-09 VITALS
DIASTOLIC BLOOD PRESSURE: 83 MMHG | WEIGHT: 227.8 LBS | BODY MASS INDEX: 30.19 KG/M2 | SYSTOLIC BLOOD PRESSURE: 126 MMHG | HEART RATE: 74 BPM | RESPIRATION RATE: 16 BRPM | HEIGHT: 73 IN

## 2022-09-09 DIAGNOSIS — I10 BENIGN ESSENTIAL HYPERTENSION: ICD-10-CM

## 2022-09-09 DIAGNOSIS — Z12.5 SCREENING FOR PROSTATE CANCER: ICD-10-CM

## 2022-09-09 DIAGNOSIS — Z00.00 ROUTINE GENERAL MEDICAL EXAMINATION AT A HEALTH CARE FACILITY: Primary | ICD-10-CM

## 2022-09-09 DIAGNOSIS — E78.2 MIXED HYPERLIPIDEMIA: ICD-10-CM

## 2022-09-09 LAB
BASOPHILS # BLD AUTO: 0.1 10E3/UL (ref 0–0.2)
BASOPHILS NFR BLD AUTO: 1 %
EOSINOPHIL # BLD AUTO: 0.1 10E3/UL (ref 0–0.7)
EOSINOPHIL NFR BLD AUTO: 2 %
ERYTHROCYTE [DISTWIDTH] IN BLOOD BY AUTOMATED COUNT: 12.9 % (ref 10–15)
HCT VFR BLD AUTO: 45.9 % (ref 40–53)
HGB BLD-MCNC: 15.3 G/DL (ref 13.3–17.7)
IMM GRANULOCYTES # BLD: 0 10E3/UL
IMM GRANULOCYTES NFR BLD: 0 %
LYMPHOCYTES # BLD AUTO: 1.9 10E3/UL (ref 0.8–5.3)
LYMPHOCYTES NFR BLD AUTO: 28 %
MCH RBC QN AUTO: 30.1 PG (ref 26.5–33)
MCHC RBC AUTO-ENTMCNC: 33.3 G/DL (ref 31.5–36.5)
MCV RBC AUTO: 90 FL (ref 78–100)
MONOCYTES # BLD AUTO: 0.4 10E3/UL (ref 0–1.3)
MONOCYTES NFR BLD AUTO: 7 %
NEUTROPHILS # BLD AUTO: 4.2 10E3/UL (ref 1.6–8.3)
NEUTROPHILS NFR BLD AUTO: 62 %
PLATELET # BLD AUTO: 280 10E3/UL (ref 150–450)
RBC # BLD AUTO: 5.09 10E6/UL (ref 4.4–5.9)
WBC # BLD AUTO: 6.8 10E3/UL (ref 4–11)

## 2022-09-09 PROCEDURE — 99213 OFFICE O/P EST LOW 20 MIN: CPT | Mod: 25 | Performed by: FAMILY MEDICINE

## 2022-09-09 PROCEDURE — 90471 IMMUNIZATION ADMIN: CPT | Performed by: FAMILY MEDICINE

## 2022-09-09 PROCEDURE — 80076 HEPATIC FUNCTION PANEL: CPT | Performed by: FAMILY MEDICINE

## 2022-09-09 PROCEDURE — 36415 COLL VENOUS BLD VENIPUNCTURE: CPT | Performed by: FAMILY MEDICINE

## 2022-09-09 PROCEDURE — 99396 PREV VISIT EST AGE 40-64: CPT | Mod: 25 | Performed by: FAMILY MEDICINE

## 2022-09-09 PROCEDURE — 90682 RIV4 VACC RECOMBINANT DNA IM: CPT | Performed by: FAMILY MEDICINE

## 2022-09-09 PROCEDURE — 85025 COMPLETE CBC W/AUTO DIFF WBC: CPT | Performed by: FAMILY MEDICINE

## 2022-09-09 PROCEDURE — G0103 PSA SCREENING: HCPCS | Performed by: FAMILY MEDICINE

## 2022-09-09 ASSESSMENT — ENCOUNTER SYMPTOMS
HEMATURIA: 0
CONSTIPATION: 0
FREQUENCY: 0
DYSURIA: 0
HEARTBURN: 0
DIZZINESS: 0
HEMATOCHEZIA: 0
ABDOMINAL PAIN: 0
DIARRHEA: 0
WEAKNESS: 0
SORE THROAT: 0
CHILLS: 0
PARESTHESIAS: 0
NAUSEA: 0
FEVER: 0
NERVOUS/ANXIOUS: 0
SHORTNESS OF BREATH: 0
PALPITATIONS: 0
ARTHRALGIAS: 0
EYE PAIN: 0
COUGH: 0
MYALGIAS: 0
JOINT SWELLING: 0
HEADACHES: 0

## 2022-09-09 NOTE — PROGRESS NOTES
"  ASSESSMENT/PLAN:       ICD-10-CM    1. Routine general medical examination at a health care facility  Z00.00 CBC with platelets and differential   2. Benign essential hypertension  I10 CBC with platelets and differential   3. Mixed hyperlipidemia  E78.2 Hepatic panel (Albumin, ALT, AST, Bili, Alk Phos, TP)   4. Screening for prostate cancer  Z12.5 PSA, screen     Medical decision making: Patient with hypertension and hyperlipidemia who presents today for annual exam.  He is on Truvada for prophylaxis.  No acute concerns.  Recently saw his ID.  Reviewed his lab panel and discussed ASCVD risk score.  His blood pressure is finally optimal with use of lisinopril and hydrochlorothiazide.  He is going to continue on the medication.  Discussed his weight and advise lifestyle modification for weight loss.      COUNSELING:   Reviewed preventive health counseling, as reflected in patient instructions       Regular exercise       Healthy diet/nutrition       Vision screening       Prostate cancer screening    Estimated body mass index is 30.47 kg/m  as calculated from the following:    Height as of this encounter: 1.842 m (6' 0.5\").    Weight as of this encounter: 103.3 kg (227 lb 12.8 oz).     Weight management plan: Discussed healthy diet and exercise guidelines    He reports that he has never smoked. He has never used smokeless tobacco.      SUBJECTIVE:   CC: Jl Sky is an 52 year old male who presents for preventative health visit.     Chief Complaint   Patient presents with     Physical     Hypertension     Patient has noticed increase in bp lately.      Patient has been advised of split billing requirements and indicates understanding: Yes  Healthy Habits:     Getting at least 3 servings of Calcium per day:  NO    Bi-annual eye exam:  Yes    Dental care twice a year:  Yes    Sleep apnea or symptoms of sleep apnea:  None    Diet:  Regular (no restrictions), Low salt and Low fat/cholesterol    Frequency of " exercise:  2-3 days/week    Duration of exercise:  15-30 minutes    Taking medications regularly:  Yes    Medication side effects:  Not applicable    PHQ-2 Total Score: 0    Additional concerns today:  No          Today's PHQ-2 Score:   PHQ-2 ( 1999 [employer]) 9/5/2022   Q1: Little interest or pleasure in doing things 0   Q2: Feeling down, depressed or hopeless 0   PHQ-2 Score 0   PHQ-2 Total Score (12-17 Years)- Positive if 3 or more points; Administer PHQ-A if positive -   Q1: Little interest or pleasure in doing things Not at all   Q2: Feeling down, depressed or hopeless Not at all   PHQ-2 Score 0       Abuse: Current or Past(Physical, Sexual or Emotional)- No  Do you feel safe in your environment? Yes        Social History     Tobacco Use     Smoking status: Never Smoker     Smokeless tobacco: Never Used   Substance Use Topics     Alcohol use: Yes     Comment: Alcoholic Drinks/day: 3 days per week, 2 drinks at a time     If you drink alcohol do you typically have >3 drinks per day or >7 drinks per week? No    No flowsheet data found.    Last PSA:   Prostate Specific Antigen Screen   Date Value Ref Range Status   08/03/2021 1.58 0.00 - 3.50 ug/L Final       Reviewed orders with patient. Reviewed health maintenance and updated orders accordingly - Yes  BP Readings from Last 3 Encounters:   09/09/22 126/83   08/29/22 (!) 158/112   02/07/22 (!) 153/110    Wt Readings from Last 3 Encounters:   09/09/22 103.3 kg (227 lb 12.8 oz)   08/29/22 103.9 kg (229 lb)   02/07/22 103.4 kg (228 lb)                  Patient Active Problem List   Diagnosis     Overweight     Hemorrhoids     Herpes Simplex Type I     Esophageal Reflux     Allergic Rhinitis     On pre-exposure prophylaxis for HIV     No past surgical history on file.    Social History     Tobacco Use     Smoking status: Never Smoker     Smokeless tobacco: Never Used   Substance Use Topics     Alcohol use: Yes     Comment: Alcoholic Drinks/day: 3 days per week, 2 drinks  at a time     Family History   Problem Relation Age of Onset     Hypertension Mother      Pulmonary Embolism Father      Diabetes Type 2  Father         500 lbs     Transient ischemic attack Sister          Current Outpatient Medications   Medication Sig Dispense Refill     emtricitabine-tenofovir (TRUVADA) 200-300 MG per tablet Take 1 tablet by mouth daily 93 tablet 3     hydrochlorothiazide (HYDRODIURIL) 12.5 MG tablet Take 1 tablet (12.5 mg) by mouth daily 90 tablet 3     lisinopril (ZESTRIL) 10 MG tablet Take 1 tablet (10 mg) by mouth daily 90 tablet 1     omeprazole (PRILOSEC) 20 MG DR capsule Take 1 capsule (20 mg) by mouth daily before breakfast 90 capsule 1     valACYclovir (VALTREX) 1000 mg tablet [VALACYCLOVIR (VALTREX) 1000 MG TABLET] Take 2 tablets by mouth twice daily for 1 day as needed. 8 tablet 6       Reviewed and updated as needed this visit by clinical staff   Tobacco                  Reviewed and updated as needed this visit by Provider   Tobacco                 Past Medical History:   Diagnosis Date     Hypertension 2019      No past surgical history on file.    Review of Systems   Constitutional: Negative for chills and fever.   HENT: Negative for congestion, ear pain, hearing loss and sore throat.    Eyes: Negative for pain and visual disturbance.   Respiratory: Negative for cough and shortness of breath.    Cardiovascular: Negative for chest pain, palpitations and peripheral edema.   Gastrointestinal: Negative for abdominal pain, constipation, diarrhea, heartburn, hematochezia and nausea.   Genitourinary: Negative for dysuria, frequency, genital sores, hematuria, impotence, penile discharge and urgency.   Musculoskeletal: Negative for arthralgias, joint swelling and myalgias.   Skin: Negative for rash.   Neurological: Negative for dizziness, weakness, headaches and paresthesias.   Psychiatric/Behavioral: Negative for mood changes. The patient is not nervous/anxious.          OBJECTIVE:   BP  "126/83 (BP Location: Left arm, Patient Position: Sitting, Cuff Size: Adult Large)   Pulse 74   Resp 16   Ht 1.842 m (6' 0.5\")   Wt 103.3 kg (227 lb 12.8 oz)   BMI 30.47 kg/m      Physical Exam  GENERAL: healthy, alert and no distress  NECK: no adenopathy, no asymmetry, masses, or scars and thyroid normal to palpation  RESP: lungs clear to auscultation - no rales, rhonchi or wheezes  CV: regular rate and rhythm, normal S1 S2, no S3 or S4, no murmur, click or rub, no peripheral edema and peripheral pulses strong  ABDOMEN: soft, nontender, no hepatosplenomegaly, no masses and bowel sounds normal  MS: no gross musculoskeletal defects noted, no edema    Diagnostic Test Results:  Labs reviewed in Epic  No results found for this or any previous visit (from the past 24 hour(s)).    Counseling Resources:  ATP IV Guidelines  Pooled Cohorts Equation Calculator  FRAX Risk Assessment  ICSI Preventive Guidelines  Dietary Guidelines for Americans, 2010  USDA's MyPlate  ASA Prophylaxis  Lung CA Screening    Samm Iraheta MD  [facility]  "

## 2022-09-10 LAB
ALBUMIN SERPL BCG-MCNC: 4.7 G/DL (ref 3.5–5.2)
ALP SERPL-CCNC: 88 U/L (ref 40–129)
ALT SERPL W P-5'-P-CCNC: 34 U/L (ref 10–50)
AST SERPL W P-5'-P-CCNC: 35 U/L (ref 10–50)
BILIRUB DIRECT SERPL-MCNC: <0.2 MG/DL (ref 0–0.3)
BILIRUB SERPL-MCNC: 0.6 MG/DL
PROT SERPL-MCNC: 7.6 G/DL (ref 6.4–8.3)
PSA SERPL-MCNC: 2.41 NG/ML (ref 0–3.5)

## 2022-09-11 DIAGNOSIS — R97.20 INCREASED PROSTATE SPECIFIC ANTIGEN (PSA) VELOCITY: Primary | ICD-10-CM

## 2022-10-11 ENCOUNTER — LAB (OUTPATIENT)
Dept: LAB | Facility: CLINIC | Age: 52
End: 2022-10-11
Payer: COMMERCIAL

## 2022-10-11 DIAGNOSIS — R97.20 INCREASED PROSTATE SPECIFIC ANTIGEN (PSA) VELOCITY: ICD-10-CM

## 2022-10-11 LAB — PSA SERPL-MCNC: 2.09 NG/ML (ref 0–3.5)

## 2022-10-11 PROCEDURE — G0103 PSA SCREENING: HCPCS

## 2022-10-11 PROCEDURE — 36415 COLL VENOUS BLD VENIPUNCTURE: CPT

## 2022-10-25 DIAGNOSIS — B00.9 HERPES SIMPLEX VIRUS (HSV) INFECTION: ICD-10-CM

## 2022-10-27 RX ORDER — VALACYCLOVIR HYDROCHLORIDE 1 G/1
TABLET, FILM COATED ORAL
Qty: 8 TABLET | Refills: 4 | Status: SHIPPED | OUTPATIENT
Start: 2022-10-27 | End: 2023-11-30

## 2022-10-27 NOTE — TELEPHONE ENCOUNTER
"Last Written Prescription Date:  10/2/21  Last Fill Quantity: 8,  # refills: 6   Last office visit provider:  9/9/22     Requested Prescriptions   Pending Prescriptions Disp Refills     valACYclovir (VALTREX) 1000 mg tablet 8 tablet 6     Sig: [VALACYCLOVIR (VALTREX) 1000 MG TABLET] Take 2 tablets by mouth twice daily for 1 day as needed.       Antivirals for Herpes Protocol Passed - 10/27/2022  8:19 AM        Passed - Patient is age 12 or older        Passed - Recent (12 mo) or future (30 days) visit within the authorizing provider's specialty     Patient has had an office visit with the authorizing provider or a provider within the authorizing providers department within the previous 12 mos or has a future within next 30 days. See \"Patient Info\" tab in inbasket, or \"Choose Columns\" in Meds & Orders section of the refill encounter.              Passed - Medication is active on med list        Passed - Normal serum creatinine on file in past 12 months     Recent Labs   Lab Test 03/02/22  0701   CR 1.06       Ok to refill medication if creatinine is low               Jl Lopez RN 10/27/22 8:19 AM  "

## 2022-12-01 ENCOUNTER — LAB (OUTPATIENT)
Dept: LAB | Facility: CLINIC | Age: 52
End: 2022-12-01
Payer: COMMERCIAL

## 2022-12-01 DIAGNOSIS — Z79.899 ON PRE-EXPOSURE PROPHYLAXIS FOR HIV: ICD-10-CM

## 2022-12-01 LAB
ANION GAP SERPL CALCULATED.3IONS-SCNC: 10 MMOL/L (ref 7–15)
BUN SERPL-MCNC: 17.4 MG/DL (ref 6–20)
C TRACH DNA SPEC QL NAA+PROBE: NEGATIVE
CALCIUM SERPL-MCNC: 9.1 MG/DL (ref 8.6–10)
CHLORIDE SERPL-SCNC: 104 MMOL/L (ref 98–107)
CREAT SERPL-MCNC: 1.09 MG/DL (ref 0.67–1.17)
DEPRECATED HCO3 PLAS-SCNC: 26 MMOL/L (ref 22–29)
GFR SERPL CREATININE-BSD FRML MDRD: 81 ML/MIN/1.73M2
GLUCOSE SERPL-MCNC: 99 MG/DL (ref 70–99)
HIV 1+2 AB+HIV1 P24 AG SERPL QL IA: NONREACTIVE
N GONORRHOEA DNA SPEC QL NAA+PROBE: NEGATIVE
POTASSIUM SERPL-SCNC: 4.1 MMOL/L (ref 3.4–5.3)
SODIUM SERPL-SCNC: 140 MMOL/L (ref 136–145)
T PALLIDUM AB SER QL: NONREACTIVE

## 2022-12-01 PROCEDURE — 86780 TREPONEMA PALLIDUM: CPT

## 2022-12-01 PROCEDURE — 36415 COLL VENOUS BLD VENIPUNCTURE: CPT

## 2022-12-01 PROCEDURE — 87591 N.GONORRHOEAE DNA AMP PROB: CPT

## 2022-12-01 PROCEDURE — 87389 HIV-1 AG W/HIV-1&-2 AB AG IA: CPT

## 2022-12-01 PROCEDURE — 80048 BASIC METABOLIC PNL TOTAL CA: CPT

## 2022-12-01 PROCEDURE — 87491 CHLMYD TRACH DNA AMP PROBE: CPT

## 2023-01-25 ENCOUNTER — TRANSFERRED RECORDS (OUTPATIENT)
Dept: HEALTH INFORMATION MANAGEMENT | Facility: CLINIC | Age: 53
End: 2023-01-25

## 2023-01-30 DIAGNOSIS — K21.9 GASTROESOPHAGEAL REFLUX DISEASE, UNSPECIFIED WHETHER ESOPHAGITIS PRESENT: ICD-10-CM

## 2023-02-01 DIAGNOSIS — K21.9 GASTROESOPHAGEAL REFLUX DISEASE, UNSPECIFIED WHETHER ESOPHAGITIS PRESENT: ICD-10-CM

## 2023-02-01 DIAGNOSIS — I10 BENIGN ESSENTIAL HYPERTENSION: ICD-10-CM

## 2023-02-01 NOTE — TELEPHONE ENCOUNTER
"Last Written Prescription Date:  8/5/22  Last Fill Quantity: 90,  # refills: 1   Last office visit provider:  9/9/22     Requested Prescriptions   Pending Prescriptions Disp Refills     omeprazole (PRILOSEC) 20 MG DR capsule 90 capsule 1     Sig: Take 1 capsule (20 mg) by mouth daily before breakfast       PPI Protocol Passed - 1/30/2023  3:56 PM        Passed - Not on Clopidogrel (unless Pantoprazole ordered)        Passed - No diagnosis of osteoporosis on record        Passed - Recent (12 mo) or future (30 days) visit within the authorizing provider's specialty     Patient has had an office visit with the authorizing provider or a provider within the authorizing providers department within the previous 12 mos or has a future within next 30 days. See \"Patient Info\" tab in inbasket, or \"Choose Columns\" in Meds & Orders section of the refill encounter.              Passed - Medication is active on med list        Passed - Patient is age 18 or older             Shyann Quintanilla RN 01/31/23 7:44 PM  "

## 2023-02-02 RX ORDER — HYDROCHLOROTHIAZIDE 12.5 MG/1
12.5 TABLET ORAL DAILY
Qty: 90 TABLET | Refills: 2 | Status: SHIPPED | OUTPATIENT
Start: 2023-02-02 | End: 2023-10-30

## 2023-02-02 RX ORDER — LISINOPRIL 10 MG/1
10 TABLET ORAL DAILY
Qty: 90 TABLET | Refills: 2 | Status: SHIPPED | OUTPATIENT
Start: 2023-02-02 | End: 2023-11-30

## 2023-02-02 NOTE — TELEPHONE ENCOUNTER
"Routing refill request to provider for review/approval because:  Drug not on the [facility] refill protocol     Last Written Prescription Date:  2/7/22  Last Fill Quantity: 90,  # refills: 3   Last office visit provider:  9/9/22     Requested Prescriptions   Pending Prescriptions Disp Refills     hydrochlorothiazide (HYDRODIURIL) 12.5 MG tablet 90 tablet 3     Sig: Take 1 tablet (12.5 mg) by mouth daily       There is no refill protocol information for this order      Signed Prescriptions Disp Refills    lisinopril (ZESTRIL) 10 MG tablet 90 tablet 2     Sig: Take 1 tablet (10 mg) by mouth daily       ACE Inhibitors (Including Combos) Protocol Passed - 2/2/2023 10:18 AM        Passed - Blood pressure under 140/90 in past 12 months     BP Readings from Last 3 Encounters:   09/09/22 126/83   08/29/22 (!) 158/112   02/07/22 (!) 153/110                 Passed - Recent (12 mo) or future (30 days) visit within the authorizing provider's specialty     Patient has had an office visit with the authorizing provider or a provider within the authorizing providers department within the previous 12 mos or has a future within next 30 days. See \"Patient Info\" tab in inbasket, or \"Choose Columns\" in Meds & Orders section of the refill encounter.              Passed - Medication is active on med list        Passed - Patient is age 18 or older        Passed - Normal serum creatinine on file in past 12 months     Recent Labs   Lab Test 12/01/22  0758   CR 1.09       Ok to refill medication if creatinine is low          Passed - Normal serum potassium on file in past 12 months     Recent Labs   Lab Test 12/01/22  0758   POTASSIUM 4.1              Refused Prescriptions Disp Refills     omeprazole (PRILOSEC) 20 MG DR capsule 90 capsule 1     Sig: Take 1 capsule (20 mg) by mouth daily before breakfast       There is no refill protocol information for this order          Jl Lopez RN 02/02/23 10:21 AM  "

## 2023-02-02 NOTE — TELEPHONE ENCOUNTER
"Last Written Prescription Date:  8/5/22  Last Fill Quantity: 90,  # refills: 1   Last office visit provider:  9/9/22     Requested Prescriptions   Pending Prescriptions Disp Refills     lisinopril (ZESTRIL) 10 MG tablet 90 tablet 1     Sig: Take 1 tablet (10 mg) by mouth daily       ACE Inhibitors (Including Combos) Protocol Passed - 2/2/2023 10:18 AM        Passed - Blood pressure under 140/90 in past 12 months     BP Readings from Last 3 Encounters:   09/09/22 126/83   08/29/22 (!) 158/112   02/07/22 (!) 153/110                 Passed - Recent (12 mo) or future (30 days) visit within the authorizing provider's specialty     Patient has had an office visit with the authorizing provider or a provider within the authorizing providers department within the previous 12 mos or has a future within next 30 days. See \"Patient Info\" tab in inbasket, or \"Choose Columns\" in Meds & Orders section of the refill encounter.              Passed - Medication is active on med list        Passed - Patient is age 18 or older        Passed - Normal serum creatinine on file in past 12 months     Recent Labs   Lab Test 12/01/22  0758   CR 1.09       Ok to refill medication if creatinine is low          Passed - Normal serum potassium on file in past 12 months     Recent Labs   Lab Test 12/01/22  0758   POTASSIUM 4.1                hydrochlorothiazide (HYDRODIURIL) 12.5 MG tablet 90 tablet 3     Sig: Take 1 tablet (12.5 mg) by mouth daily       There is no refill protocol information for this order        omeprazole (PRILOSEC) 20 MG DR capsule 90 capsule 1     Sig: Take 1 capsule (20 mg) by mouth daily before breakfast       There is no refill protocol information for this order          Jl Lopez RN 02/02/23 10:19 AM  "

## 2023-03-13 ENCOUNTER — LAB (OUTPATIENT)
Dept: LAB | Facility: CLINIC | Age: 53
End: 2023-03-13
Payer: COMMERCIAL

## 2023-03-13 DIAGNOSIS — Z79.899 ON PRE-EXPOSURE PROPHYLAXIS FOR HIV: ICD-10-CM

## 2023-03-13 LAB
ANION GAP SERPL CALCULATED.3IONS-SCNC: 11 MMOL/L (ref 7–15)
BUN SERPL-MCNC: 18.4 MG/DL (ref 6–20)
C TRACH DNA SPEC QL NAA+PROBE: NEGATIVE
CALCIUM SERPL-MCNC: 9.4 MG/DL (ref 8.6–10)
CHLORIDE SERPL-SCNC: 106 MMOL/L (ref 98–107)
CREAT SERPL-MCNC: 1.06 MG/DL (ref 0.67–1.17)
DEPRECATED HCO3 PLAS-SCNC: 25 MMOL/L (ref 22–29)
GFR SERPL CREATININE-BSD FRML MDRD: 83 ML/MIN/1.73M2
GLUCOSE SERPL-MCNC: 103 MG/DL (ref 70–99)
HIV 1+2 AB+HIV1 P24 AG SERPL QL IA: NONREACTIVE
N GONORRHOEA DNA SPEC QL NAA+PROBE: NEGATIVE
POTASSIUM SERPL-SCNC: 4.5 MMOL/L (ref 3.4–5.3)
SODIUM SERPL-SCNC: 142 MMOL/L (ref 136–145)
T PALLIDUM AB SER QL: NONREACTIVE

## 2023-03-13 PROCEDURE — 80048 BASIC METABOLIC PNL TOTAL CA: CPT

## 2023-03-13 PROCEDURE — 86780 TREPONEMA PALLIDUM: CPT

## 2023-03-13 PROCEDURE — 87591 N.GONORRHOEAE DNA AMP PROB: CPT

## 2023-03-13 PROCEDURE — 36415 COLL VENOUS BLD VENIPUNCTURE: CPT

## 2023-03-13 PROCEDURE — 87389 HIV-1 AG W/HIV-1&-2 AB AG IA: CPT

## 2023-03-13 PROCEDURE — 87491 CHLMYD TRACH DNA AMP PROBE: CPT

## 2023-07-03 ENCOUNTER — LAB (OUTPATIENT)
Dept: LAB | Facility: CLINIC | Age: 53
End: 2023-07-03
Payer: COMMERCIAL

## 2023-07-03 DIAGNOSIS — Z79.899 ON PRE-EXPOSURE PROPHYLAXIS FOR HIV: ICD-10-CM

## 2023-07-03 LAB
C TRACH DNA SPEC QL NAA+PROBE: NEGATIVE
HIV 1+2 AB+HIV1 P24 AG SERPL QL IA: NONREACTIVE
N GONORRHOEA DNA SPEC QL NAA+PROBE: NEGATIVE
T PALLIDUM AB SER QL: NONREACTIVE

## 2023-07-03 PROCEDURE — 36415 COLL VENOUS BLD VENIPUNCTURE: CPT

## 2023-07-03 PROCEDURE — 86780 TREPONEMA PALLIDUM: CPT

## 2023-07-03 PROCEDURE — 87389 HIV-1 AG W/HIV-1&-2 AB AG IA: CPT

## 2023-07-03 PROCEDURE — 87591 N.GONORRHOEAE DNA AMP PROB: CPT

## 2023-07-03 PROCEDURE — 87491 CHLMYD TRACH DNA AMP PROBE: CPT

## 2023-08-07 DIAGNOSIS — K21.9 GASTROESOPHAGEAL REFLUX DISEASE, UNSPECIFIED WHETHER ESOPHAGITIS PRESENT: ICD-10-CM

## 2023-08-08 NOTE — TELEPHONE ENCOUNTER
"Last Written Prescription Date:  1/31/23  Last Fill Quantity: 90,  # refills: 1   Last office visit provider:  9/9/22     Requested Prescriptions   Pending Prescriptions Disp Refills    omeprazole (PRILOSEC) 20 MG DR capsule 90 capsule 1     Sig: Take 1 capsule (20 mg) by mouth daily before breakfast       PPI Protocol Passed - 8/7/2023  5:46 PM        Passed - Not on Clopidogrel (unless Pantoprazole ordered)        Passed - No diagnosis of osteoporosis on record        Passed - Recent (12 mo) or future (30 days) visit within the authorizing provider's specialty     Patient has had an office visit with the authorizing provider or a provider within the authorizing providers department within the previous 12 mos or has a future within next 30 days. See \"Patient Info\" tab in inbasket, or \"Choose Columns\" in Meds & Orders section of the refill encounter.              Passed - Medication is active on med list        Passed - Patient is age 18 or older             Huma Gil 08/07/23 7:17 PM  "

## 2023-08-10 ENCOUNTER — PATIENT OUTREACH (OUTPATIENT)
Dept: CARE COORDINATION | Facility: CLINIC | Age: 53
End: 2023-08-10
Payer: COMMERCIAL

## 2023-08-25 DIAGNOSIS — Z79.899 ON PRE-EXPOSURE PROPHYLAXIS FOR HIV: ICD-10-CM

## 2023-08-25 RX ORDER — EMTRICITABINE AND TENOFOVIR DISOPROXIL FUMARATE 200; 300 MG/1; MG/1
1 TABLET, FILM COATED ORAL DAILY
Qty: 90 TABLET | Refills: 0 | Status: SHIPPED | OUTPATIENT
Start: 2023-08-25 | End: 2023-12-04

## 2023-08-25 NOTE — TELEPHONE ENCOUNTER
"Requested Prescriptions   Pending Prescriptions Disp Refills    emtricitabine-tenofovir (TRUVADA) 200-300 MG per tablet [Pharmacy Med Name: EMTRICITABINE/TENOF 200-300MG TABS] 93 tablet 3     Sig: TAKE 1 TABLET BY MOUTH DAILY       Antiretroviral Agents Failed - 8/25/2023  5:44 AM        Failed - AST less than 55 on file in past 3 mos     Recent Labs   Lab Test 09/09/22  1529   AST 35             Failed - Refills for this medication group require provider approval        Failed - Serum Creatinine less than or equal to 1.4 on file in past 3 mos     Recent Labs   Lab Test 03/13/23  0858   CR 1.06       Ok to refill medication if creatinine is low          Failed - Creatinine Clearance greater than 50 ml/min on file in past 3 mos.     No lab results found.          Failed - Serum HIV less than 200 on file in past 3 mos.     No lab results found.          Failed - CD4 count greater than 300 on file in past 3 mos.     No lab results found.          Failed - ALT less than 90 on file in past 3 mos.     Recent Labs   Lab Test 09/09/22  1529   ALT 34             Passed - Recent (3 mo) or future (30 days) visit within the authorizing provider's specialty     Patient had office visit in the last 3 months or has a visit in the next 30 days with authorizing provider or within the authorizing provider's specialty.  See \"Patient Info\" tab in inbasket, or \"Choose Columns\" in Meds & Orders section of the refill encounter.                Passed - Medication is active on med list        Passed - Patient is age 18 or older             "

## 2023-09-11 ENCOUNTER — OFFICE VISIT (OUTPATIENT)
Dept: INFECTIOUS DISEASES | Facility: CLINIC | Age: 53
End: 2023-09-11
Payer: COMMERCIAL

## 2023-09-11 VITALS
TEMPERATURE: 98 F | WEIGHT: 234.3 LBS | SYSTOLIC BLOOD PRESSURE: 148 MMHG | OXYGEN SATURATION: 98 % | DIASTOLIC BLOOD PRESSURE: 90 MMHG | HEART RATE: 78 BPM | BODY MASS INDEX: 31.34 KG/M2

## 2023-09-11 DIAGNOSIS — Z79.899 ON PRE-EXPOSURE PROPHYLAXIS FOR HIV: Primary | ICD-10-CM

## 2023-09-11 PROCEDURE — 99214 OFFICE O/P EST MOD 30 MIN: CPT

## 2023-09-11 NOTE — PROGRESS NOTES
Patient is a 50-year-old gentleman referred by Samm Iraheta MD for HIV preexposure prophylaxis counseling        Patient is currently not in a monogamous relationship.  He was started on Truvada about a year ago, which he is tolerating well.    He was diagnosed with gonorrhea in January.  He was started on lisinopril in March.    We discussed the risks and benefits of true beta including that it protects against Truvada susceptible strains of HIV which constitutes most of the strain circulating, but not all of them.    We discussed that condoms are better protection against other sexually transmitted infections and Truvada does not protect against any other sexually transmitted infections.    There was also some question about anal Pap smears, which are indicated for men having sex with men.  I did make referral to colorectal surgery for those Pap smears.    Follow-up visit on August 30, 2021:    Patient states he is doing well.  He says he has not been sexually active in many months.  He has many questions about starting and stopping true beta for preexposure prophylaxis.    Follow-up visit on August 29, 2022:    Patient is doing generally well with his medications.  His blood pressure is a bit high.    Has not been getting his every 3-month HIV test or STD test.  Make sure those are done.    We did discuss cabotegravir, but as he is on daily medications for his hypertension, we could keep him on his Garima.    We also discussed monkey box.  He is not meet the current criteria for administration of the vaccine.  However, should the vaccine becomes generally available, I would recommend it for him.    He states he is not very active sexually, last encounter was a month ago.    Follow-up visit on September 11, 2023:    Doing well.  Been having some issues with his blood pressure being elevated.  He is on the same dose of hydrochlorothiazide and lisinopril at his last visit.      GENERAL: Healthy, alert and  no distress  EYES: Eyes grossly normal to inspection. No discharge or erythema, or obvious scleral/conjunctival abnormalities.  RESP: Normal respiratory pattern  NEURO: Cranial nerves grossly intact. Mentation and speech appropriate for age.  PSYCH: Mentation appears normal, affect normal/bright, judgement and insight intact, normal speech and appearance well-groomed    Impression: Sexually active man who has sex with other men.    Past infection with gonorrhea.  No current symptoms      Recommendations: Truvada is still appropriate.  Over, we will see if we can get him Descovy instead of Truvada.    Orders Placed This Encounter   Procedures    Treponema Abs w Reflex to RPR and Titer     Standing Status:   Standing     Number of Occurrences:   6     Standing Expiration Date:   9/11/2024    HIV Antigen Antibody Combo     Standing Status:   Standing     Number of Occurrences:   6     Standing Expiration Date:   9/11/2024    NEISSERIA GONORRHOEA PCR     Standing Status:   Standing     Number of Occurrences:   6     Standing Expiration Date:   9/11/2024    CHLAMYDIA TRACHOMATIS PCR     Standing Status:   Standing     Number of Occurrences:   6     Standing Expiration Date:   9/11/2024       Follow-up in 1 year.    Norris Evans MD

## 2023-09-18 ENCOUNTER — TELEPHONE (OUTPATIENT)
Dept: INFECTIOUS DISEASES | Facility: CLINIC | Age: 53
End: 2023-09-18
Payer: COMMERCIAL

## 2023-09-20 NOTE — TELEPHONE ENCOUNTER
PA Initiation    Medication: DESCOVY 200-25 MG PO TABS  Insurance Company: [employer] - Phone [phone] Fax [phone]  Pharmacy Filling the Rx:    Filling Pharmacy Phone:    Filling Pharmacy Fax:    Start Date: 9/20/2023              MARION Cruz, [facility]  Specialty Pharmacy Clinic [facility]    [email or web address]     Phone: [phone]  Fax: [phone]

## 2023-09-22 NOTE — TELEPHONE ENCOUNTER
UPDATE  Medication: DESCOVY 200-25 MG PO TABS  Insurance Company: [employer] - Phone [phone] Fax [phone]  Pharmacy Filling the Rx: [id number] [place] [address] AT [facility] [address]   PA pending clinical Q's sent to MARION Jackson, [facility]  Specialty Pharmacy Clinic Liaison     [facility]    [email or web address]     Phone: [phone]  Fax: [phone]

## 2023-09-22 NOTE — TELEPHONE ENCOUNTER
Central Prior Authorization Team   Phone: [phone]      Rep from Nicole from [facility] to check status. Relayed that has submitted to patient insurance and we are waiting for a response from them.

## 2023-09-25 NOTE — TELEPHONE ENCOUNTER
Prior Authorization Approval    Medication: DESCOVY 200-25 MG PO TABS  Authorization Effective Date: 9/25/2023  Authorization Expiration Date: 9/21/2024  Approved Dose/Quantity: 30-30  Reference #: [id number]   Insurance Company: [employer] - Phone [phone] Fax [phone]  Expected CoPay:       CoPay Card Available:      Financial Assistance Needed: NA  Which Pharmacy is filling the prescription: [facility] #[id number] [place] [id number] AT [facility] [address]  Pharmacy Notified: Yes  Patient Notified: Yes          MARION Cruz, [facility]  Specialty Pharmacy Clinic Liaison     [facility] Specialty    [email or web address]     Phone: [phone]  Fax: [phone]

## 2023-10-15 ENCOUNTER — HEALTH MAINTENANCE LETTER (OUTPATIENT)
Age: 53
End: 2023-10-15

## 2023-10-30 DIAGNOSIS — I10 BENIGN ESSENTIAL HYPERTENSION: ICD-10-CM

## 2023-10-31 RX ORDER — HYDROCHLOROTHIAZIDE 12.5 MG/1
12.5 TABLET ORAL DAILY
Qty: 90 TABLET | Refills: 2 | Status: SHIPPED | OUTPATIENT
Start: 2023-10-31 | End: 2023-11-30

## 2023-11-06 ENCOUNTER — MYC MEDICAL ADVICE (OUTPATIENT)
Dept: FAMILY MEDICINE | Facility: CLINIC | Age: 53
End: 2023-11-06
Payer: COMMERCIAL

## 2023-11-06 DIAGNOSIS — K21.9 GASTROESOPHAGEAL REFLUX DISEASE, UNSPECIFIED WHETHER ESOPHAGITIS PRESENT: ICD-10-CM

## 2023-11-23 ASSESSMENT — ENCOUNTER SYMPTOMS
EYE PAIN: 0
HEARTBURN: 0
ARTHRALGIAS: 0
CHILLS: 0
DYSURIA: 0
PALPITATIONS: 0
SHORTNESS OF BREATH: 0
NAUSEA: 0
HEADACHES: 0
HEMATOCHEZIA: 0
CONSTIPATION: 0
FEVER: 0
FREQUENCY: 0
SORE THROAT: 0
PARESTHESIAS: 0
JOINT SWELLING: 0
DIARRHEA: 0
DIZZINESS: 0
HEMATURIA: 0
ABDOMINAL PAIN: 0
WEAKNESS: 0
NERVOUS/ANXIOUS: 0
COUGH: 0
MYALGIAS: 0

## 2023-11-27 ENCOUNTER — TELEPHONE (OUTPATIENT)
Dept: INFECTIOUS DISEASES | Facility: CLINIC | Age: 53
End: 2023-11-27
Payer: COMMERCIAL

## 2023-11-27 DIAGNOSIS — Z79.899 ON PRE-EXPOSURE PROPHYLAXIS FOR HIV: ICD-10-CM

## 2023-11-27 RX ORDER — EMTRICITABINE AND TENOFOVIR DISOPROXIL FUMARATE 200; 300 MG/1; MG/1
1 TABLET, FILM COATED ORAL DAILY
Qty: 90 TABLET | Refills: 0 | OUTPATIENT
Start: 2023-11-27

## 2023-11-27 NOTE — TELEPHONE ENCOUNTER
Recommendations: Truvada is still appropriate.  Over, we will see if we can get him Descovy instead of Truvada

## 2023-11-30 ENCOUNTER — OFFICE VISIT (OUTPATIENT)
Dept: FAMILY MEDICINE | Facility: CLINIC | Age: 53
End: 2023-11-30
Payer: COMMERCIAL

## 2023-11-30 VITALS
HEART RATE: 62 BPM | SYSTOLIC BLOOD PRESSURE: 155 MMHG | WEIGHT: 231.2 LBS | DIASTOLIC BLOOD PRESSURE: 111 MMHG | BODY MASS INDEX: 30.64 KG/M2 | OXYGEN SATURATION: 95 % | HEIGHT: 73 IN | RESPIRATION RATE: 16 BRPM

## 2023-11-30 DIAGNOSIS — B00.9 HERPES SIMPLEX VIRUS (HSV) INFECTION: ICD-10-CM

## 2023-11-30 DIAGNOSIS — Z00.00 ROUTINE GENERAL MEDICAL EXAMINATION AT A HEALTH CARE FACILITY: Primary | ICD-10-CM

## 2023-11-30 DIAGNOSIS — K21.9 GASTROESOPHAGEAL REFLUX DISEASE, UNSPECIFIED WHETHER ESOPHAGITIS PRESENT: ICD-10-CM

## 2023-11-30 DIAGNOSIS — E78.2 MIXED HYPERLIPIDEMIA: ICD-10-CM

## 2023-11-30 DIAGNOSIS — Z12.5 SCREENING FOR PROSTATE CANCER: ICD-10-CM

## 2023-11-30 DIAGNOSIS — I10 BENIGN ESSENTIAL HYPERTENSION: ICD-10-CM

## 2023-11-30 DIAGNOSIS — L81.4 LENTIGO: ICD-10-CM

## 2023-11-30 PROCEDURE — G0103 PSA SCREENING: HCPCS | Performed by: FAMILY MEDICINE

## 2023-11-30 PROCEDURE — 90471 IMMUNIZATION ADMIN: CPT | Performed by: FAMILY MEDICINE

## 2023-11-30 PROCEDURE — 80061 LIPID PANEL: CPT | Performed by: FAMILY MEDICINE

## 2023-11-30 PROCEDURE — 36415 COLL VENOUS BLD VENIPUNCTURE: CPT | Performed by: FAMILY MEDICINE

## 2023-11-30 PROCEDURE — 80053 COMPREHEN METABOLIC PANEL: CPT | Performed by: FAMILY MEDICINE

## 2023-11-30 PROCEDURE — 90715 TDAP VACCINE 7 YRS/> IM: CPT | Performed by: FAMILY MEDICINE

## 2023-11-30 PROCEDURE — 99214 OFFICE O/P EST MOD 30 MIN: CPT | Mod: 25 | Performed by: FAMILY MEDICINE

## 2023-11-30 PROCEDURE — 99396 PREV VISIT EST AGE 40-64: CPT | Mod: 25 | Performed by: FAMILY MEDICINE

## 2023-11-30 RX ORDER — LISINOPRIL 20 MG/1
20 TABLET ORAL DAILY
Qty: 90 TABLET | Refills: 3 | Status: SHIPPED | OUTPATIENT
Start: 2023-11-30

## 2023-11-30 RX ORDER — HYDROCHLOROTHIAZIDE 12.5 MG/1
12.5 TABLET ORAL DAILY
Qty: 90 TABLET | Refills: 4 | Status: SHIPPED | OUTPATIENT
Start: 2023-11-30

## 2023-11-30 RX ORDER — VALACYCLOVIR HYDROCHLORIDE 1 G/1
TABLET, FILM COATED ORAL
Qty: 8 TABLET | Refills: 4 | Status: SHIPPED | OUTPATIENT
Start: 2023-11-30

## 2023-11-30 ASSESSMENT — ENCOUNTER SYMPTOMS
SORE THROAT: 0
NAUSEA: 0
FEVER: 0
FREQUENCY: 0
JOINT SWELLING: 0
HEMATURIA: 0
HEARTBURN: 0
PALPITATIONS: 0
SHORTNESS OF BREATH: 0
COUGH: 0
ABDOMINAL PAIN: 0
EYE PAIN: 0
CHILLS: 0
CONSTIPATION: 0
WEAKNESS: 0
DIZZINESS: 0
ARTHRALGIAS: 0
DIARRHEA: 0
DYSURIA: 0
HEMATOCHEZIA: 0
PARESTHESIAS: 0
HEADACHES: 0
MYALGIAS: 0
NERVOUS/ANXIOUS: 0

## 2023-11-30 NOTE — PROGRESS NOTES
"  ASSESSMENT/PLAN:       ICD-10-CM    1. Routine general medical examination at a health care facility  Z00.00       2. Benign essential hypertension  I10 lisinopril (ZESTRIL) 20 MG tablet     hydrochlorothiazide (HYDRODIURIL) 12.5 MG tablet     Comprehensive metabolic panel (BMP + Alb, Alk Phos, ALT, AST, Total. Bili, TP)     Comprehensive metabolic panel (BMP + Alb, Alk Phos, ALT, AST, Total. Bili, TP)      3. Herpes simplex virus (HSV) infection  B00.9 valACYclovir (VALTREX) 1000 mg tablet      4. Gastroesophageal reflux disease, unspecified whether esophagitis present  K21.9 omeprazole (PRILOSEC) 20 MG DR capsule      5. Screening for prostate cancer  Z12.5 PSA, screen     PSA, screen      6. Mixed hyperlipidemia  E78.2 Lipid panel     Lipid panel      7. Lentigo  L81.4 Adult Dermatology [facility] Referral        Patient presents today for annual physical.  Following issues addressed  1: Hypertension: Blood pressure under suboptimal control.  Increase lisinopril to 20 mg daily.  Follow-up for nurse visit in 1 month time.  2: Mixed hyperlipidemia: Discussed his increased ASCVD risk.  Recommend starting 10 mg of statin after checking blood work today.  He is agreeable.  Further he will get his repeat lab check done when he comes for a nurse visit in 1 month time.  3: GERD: Medication refilled  4: GERD: Med refilled  5: Lentigo and other skin lesions.  I would recommend yearly skin check with a dermatologist and referral is placed.      COUNSELING:   Reviewed preventive health counseling, as reflected in patient instructions       Regular exercise       Healthy diet/nutrition       Prostate cancer screening      BMI:   Estimated body mass index is 30.93 kg/m  as calculated from the following:    Height as of this encounter: 1.842 m (6' 0.5\").    Weight as of this encounter: 104.9 kg (231 lb 3.2 oz).   Weight management plan: Discussed healthy diet and exercise guidelines      He reports that he has never smoked. He " has never used smokeless tobacco.        SUBJECTIVE:   Jl is a 53 year old, presenting for the following:  Physical (Pt is fasting today, no concerns today. )        11/30/2023     9:55 AM   Additional Questions   Roomed by Sol Noble CMA       Healthy Habits:     Getting at least 3 servings of Calcium per day:  NO    Bi-annual eye exam:  Yes    Dental care twice a year:  Yes    Sleep apnea or symptoms of sleep apnea:  None    Diet:  Regular (no restrictions), Low salt, Low fat/cholesterol and Breakfast skipped    Frequency of exercise:  2-3 days/week    Duration of exercise:  15-30 minutes    Taking medications regularly:  Yes    Barriers to taking medications:  None    Medication side effects:  None    Additional concerns today:  No      Today's PHQ-2 Score:       11/30/2023     9:28 AM   PHQ-2 ( 1999 [employer])   Q1: Little interest or pleasure in doing things 0   Q2: Feeling down, depressed or hopeless 0   PHQ-2 Score 0   Q1: Little interest or pleasure in doing things Not at all   Q2: Feeling down, depressed or hopeless Not at all   PHQ-2 Score 0       Have you ever done Advance Care Planning? (For example, a Health Directive, POLST, or a discussion with a medical provider or your loved ones about your wishes): No, advance care planning information given to patient to review.  Patient plans to discuss their wishes with loved ones or provider.      Social History     Tobacco Use    Smoking status: Never    Smokeless tobacco: Never   Substance Use Topics    Alcohol use: Yes     Comment: Alcoholic Drinks/day: 3 days per week, 2 drinks at a time             11/23/2023    10:02 AM   Alcohol Use   Prescreen: >3 drinks/day or >7 drinks/week? No          No data to display                Last PSA:   Prostate Specific Antigen Screen   Date Value Ref Range Status   10/11/2022 2.09 0.00 - 3.50 ng/mL Final   08/03/2021 1.58 0.00 - 3.50 ug/L Final       Reviewed orders with patient. Reviewed health maintenance and updated  orders accordingly - Yes  BP Readings from Last 3 Encounters:   11/30/23 (!) 155/111   09/11/23 (!) 148/90   09/09/22 126/83    Wt Readings from Last 3 Encounters:   11/30/23 104.9 kg (231 lb 3.2 oz)   09/11/23 106.3 kg (234 lb 4.8 oz)   09/09/22 103.3 kg (227 lb 12.8 oz)                  Patient Active Problem List   Diagnosis    Overweight    Hemorrhoids    Herpes Simplex Type I    Esophageal Reflux    Allergic Rhinitis    On pre-exposure prophylaxis for HIV    Increased prostate specific antigen (PSA) velocity     History reviewed. No pertinent surgical history.    Social History     Tobacco Use    Smoking status: Never    Smokeless tobacco: Never   Substance Use Topics    Alcohol use: Yes     Comment: Alcoholic Drinks/day: 3 days per week, 2 drinks at a time     Family History   Problem Relation Age of Onset    Hypertension Mother     Pulmonary Embolism Father     Diabetes Type 2  Father         500 lbs    Transient ischemic attack Sister          Current Outpatient Medications   Medication Sig Dispense Refill    hydrochlorothiazide (HYDRODIURIL) 12.5 MG tablet Take 1 tablet (12.5 mg) by mouth daily 90 tablet 4    lisinopril (ZESTRIL) 20 MG tablet Take 1 tablet (20 mg) by mouth daily 90 tablet 3    omeprazole (PRILOSEC) 20 MG DR capsule Take 1 capsule (20 mg) by mouth daily before breakfast 90 capsule 4    valACYclovir (VALTREX) 1000 mg tablet [VALACYCLOVIR (VALTREX) 1000 MG TABLET] Take 2 tablets by mouth twice daily for 1 day as needed. 8 tablet 4    emtricitabine-tenofovir (TRUVADA) 200-300 MG per tablet TAKE 1 TABLET BY MOUTH DAILY 90 tablet 0    emtricitabine-tenofovir AF (DESCOVY) 200-25 MG per tablet Take 1 tablet by mouth daily 90 tablet 3       Reviewed and updated as needed this visit by clinical staff   Tobacco  Allergies  Meds  Problems  Med Hx  Surg Hx  Fam Hx          Reviewed and updated as needed this visit by Provider   Tobacco  Allergies  Meds  Problems  Med Hx  Surg Hx  Fam Hx    "      Past Medical History:   Diagnosis Date    Hypertension 2019      History reviewed. No pertinent surgical history.    Review of Systems   Constitutional:  Negative for chills and fever.   HENT:  Negative for congestion, ear pain, hearing loss and sore throat.    Eyes:  Negative for pain and visual disturbance.   Respiratory:  Negative for cough and shortness of breath.    Cardiovascular:  Negative for chest pain, palpitations and peripheral edema.   Gastrointestinal:  Negative for abdominal pain, constipation, diarrhea, heartburn, hematochezia and nausea.   Genitourinary:  Negative for dysuria, frequency, genital sores, hematuria, impotence, penile discharge and urgency.   Musculoskeletal:  Negative for arthralgias, joint swelling and myalgias.   Skin:  Negative for rash.   Neurological:  Negative for dizziness, weakness, headaches and paresthesias.   Psychiatric/Behavioral:  Negative for mood changes. The patient is not nervous/anxious.      CONSTITUTIONAL: NEGATIVE for fever, chills, change in weight  INTEGUMENTARY/SKIN: NEGATIVE for worrisome rashes, moles or lesions  EYES: NEGATIVE for vision changes or irritation  ENT: NEGATIVE for ear, mouth and throat problems  RESP: NEGATIVE for significant cough or SOB  CV: NEGATIVE for chest pain, palpitations or peripheral edema  GI: NEGATIVE for nausea, abdominal pain, heartburn, or change in bowel habits   male: negative for dysuria, hematuria, decreased urinary stream, erectile dysfunction, urethral discharge  MUSCULOSKELETAL: NEGATIVE for significant arthralgias or myalgia  NEURO: NEGATIVE for weakness, dizziness or paresthesias  PSYCHIATRIC: NEGATIVE for changes in mood or affect    OBJECTIVE:   BP (!) 155/111 (BP Location: Left arm, Patient Position: Sitting, Cuff Size: Adult Large)   Pulse 62   Resp 16   Ht 1.842 m (6' 0.5\")   Wt 104.9 kg (231 lb 3.2 oz)   SpO2 95%   BMI 30.93 kg/m      Physical Exam  GENERAL: healthy, alert and no distress  NECK: no " adenopathy, no asymmetry, masses, or scars and thyroid normal to palpation  RESP: lungs clear to auscultation - no rales, rhonchi or wheezes  CV: regular rate and rhythm, normal S1 S2, no S3 or S4, no murmur, click or rub, no peripheral edema and peripheral pulses strong  ABDOMEN: soft, nontender, no hepatosplenomegaly, no masses and bowel sounds normal  MS: no gross musculoskeletal defects noted, no edema    Diagnostic Test Results:  Labs reviewed in Epic  No results found for this or any previous visit (from the past 24 hour(s)).          Samm Iraheta MD  [facility]

## 2023-11-30 NOTE — TELEPHONE ENCOUNTER
Patient is returning a call from the nurse. Writer had tried to reach the backline a few times, backline was not available. Patient is wanting to get a call back. Please advise.

## 2023-11-30 NOTE — TELEPHONE ENCOUNTER
LM for pt to c/b     At last visit provider sent in new medication Descovy.     Did patient not switch medication?

## 2023-11-30 NOTE — TELEPHONE ENCOUNTER
I called  ept, he just finished truvada and would like to start descovy, however his pharmacy said they need approval. I informed I will call the pharmacy. I called the pharmacy, they have the RX sent 9/11/23 and will fill.

## 2023-11-30 NOTE — TELEPHONE ENCOUNTER
Patient looking for a follow-up call in regards to the status of the medications and/or prior authorization. Please review and advise. Thank you.

## 2023-12-01 LAB
ALBUMIN SERPL BCG-MCNC: 4.5 G/DL (ref 3.5–5.2)
ALP SERPL-CCNC: 92 U/L (ref 40–150)
ALT SERPL W P-5'-P-CCNC: 39 U/L (ref 0–70)
ANION GAP SERPL CALCULATED.3IONS-SCNC: 10 MMOL/L (ref 7–15)
AST SERPL W P-5'-P-CCNC: 29 U/L (ref 0–45)
BILIRUB SERPL-MCNC: 0.4 MG/DL
BUN SERPL-MCNC: 14.3 MG/DL (ref 6–20)
CALCIUM SERPL-MCNC: 9.6 MG/DL (ref 8.6–10)
CHLORIDE SERPL-SCNC: 104 MMOL/L (ref 98–107)
CHOLEST SERPL-MCNC: 205 MG/DL
CREAT SERPL-MCNC: 1.17 MG/DL (ref 0.67–1.17)
DEPRECATED HCO3 PLAS-SCNC: 28 MMOL/L (ref 22–29)
EGFRCR SERPLBLD CKD-EPI 2021: 74 ML/MIN/1.73M2
GLUCOSE SERPL-MCNC: 90 MG/DL (ref 70–99)
HDLC SERPL-MCNC: 43 MG/DL
LDLC SERPL CALC-MCNC: 134 MG/DL
NONHDLC SERPL-MCNC: 162 MG/DL
POTASSIUM SERPL-SCNC: 5.1 MMOL/L (ref 3.4–5.3)
PROT SERPL-MCNC: 7.7 G/DL (ref 6.4–8.3)
PSA SERPL DL<=0.01 NG/ML-MCNC: 2.17 NG/ML (ref 0–3.5)
SODIUM SERPL-SCNC: 142 MMOL/L (ref 135–145)
TRIGL SERPL-MCNC: 138 MG/DL

## 2023-12-04 RX ORDER — EMTRICITABINE AND TENOFOVIR DISOPROXIL FUMARATE 200; 300 MG/1; MG/1
1 TABLET, FILM COATED ORAL DAILY
Qty: 90 TABLET | Refills: 0 | Status: SHIPPED | OUTPATIENT
Start: 2023-12-04 | End: 2024-02-29

## 2023-12-04 NOTE — TELEPHONE ENCOUNTER
Kirill for pt to c/b.    Will send in uv[place] for now and will send to pharmacy liaisons to see if there is anything to help with the cost.

## 2023-12-04 NOTE — TELEPHONE ENCOUNTER
Pt is calling to inform team that he is unable to afford descovy as the price was around $4,000. Pt would like to speak with a pharmacy liaison if possible. Please call pt back at [phone].     Pt was also wondering if he should go back to [facility]. Pt would like to also talk to a nurse as well about medication. Please call pt back at [phone].

## 2023-12-05 DIAGNOSIS — E78.2 MIXED HYPERLIPIDEMIA: ICD-10-CM

## 2023-12-05 DIAGNOSIS — I10 BENIGN ESSENTIAL HYPERTENSION: Primary | ICD-10-CM

## 2023-12-05 RX ORDER — ATORVASTATIN CALCIUM 10 MG/1
10 TABLET, FILM COATED ORAL DAILY
Qty: 90 TABLET | Refills: 3 | Status: SHIPPED | OUTPATIENT
Start: 2023-12-05

## 2023-12-07 ENCOUNTER — VIRTUAL VISIT (OUTPATIENT)
Dept: PHARMACY | Facility: CLINIC | Age: 53
End: 2023-12-07
Payer: COMMERCIAL

## 2023-12-07 DIAGNOSIS — Z79.899 ON PRE-EXPOSURE PROPHYLAXIS FOR HIV: Primary | ICD-10-CM

## 2023-12-07 DIAGNOSIS — E78.5 HYPERLIPIDEMIA LDL GOAL <100: ICD-10-CM

## 2023-12-07 DIAGNOSIS — B00.9 HERPES SIMPLEX VIRUS (HSV) INFECTION: ICD-10-CM

## 2023-12-07 DIAGNOSIS — K21.9 GASTROESOPHAGEAL REFLUX DISEASE, UNSPECIFIED WHETHER ESOPHAGITIS PRESENT: ICD-10-CM

## 2023-12-07 DIAGNOSIS — I10 BENIGN ESSENTIAL HYPERTENSION: ICD-10-CM

## 2023-12-07 PROCEDURE — 99605 MTMS BY PHARM NP 15 MIN: CPT | Performed by: PHARMACIST

## 2023-12-07 PROCEDURE — 99607 MTMS BY PHARM ADDL 15 MIN: CPT | Performed by: PHARMACIST

## 2023-12-07 NOTE — PROGRESS NOTES
Medication Therapy Management (MTM) Encounter    ASSESSMENT:                            Medication Adherence/Access: see below    HIV PrEP:   Would be helpful to clarify if he would have an expensive copay for Descovy every month or just the first few months (and then copay card could cover up to $7,200 per year). He will call insurance to ask. Will plan to continue Truvada for now since renal function stable. Plans to get labs check next week including HIV RNA.    Hypertension:   Blood pressure not at goal <140/90. Getting labs soon to recheck potassium and renal function. Blood pressure recheck scheduled.     Hyperlipidemia:   Repeat future lipid panel ordered to assess statin effectiveness     HSV:   stable    GERD:   Famotidine as needed could help for any breakthrough heartburn symptoms if he further decreases frequency of using omeprazole.     PLAN:                            Call insurance to see if Descovy would be expensive every month or just for the first 1-2 months. Copay card would cover $7,200 per year.   Continue Truvada once daily if Descovy is too expensive - HIV ag/ab every 3 months  Omeprazole is a proton pump inhibitor. It can decrease how well your body absorbed calcium from your diet. This can contribute to weaker bones over time. To taper of omeprazole, you could try reducing to every other day for 1-2 months and use famotidine 20 mg as needed for breakthrough symptoms (this is available over the counter or you can ask for a prescription). Once you can go a few weeks without breakthrough symptoms, you can try stopping omeprazole and continuing famotidine as needed.     Follow-up: as needed     SUBJECTIVE/OBJECTIVE:                          Jl Sky is a 53 year old male called for an initial visit. He was referred to me from Lona Borrero RN.      Reason for visit: Descovy cost.    Allergies/ADRs: Reviewed in chart  Past Medical History: Reviewed in chart  Tobacco: He reports that he has  never smoked. He has never used smokeless tobacco.  Alcohol: 3 days per week, 2 drinks at a time   Interested in weight loss  Medication Adherence/Access: Descovy is expensive; $4,000 at the pharmacy     HIV PrEP:   Currently taking Truvada once daily. Just picked up 90 day supply. Was interested in switching to Descvoy due to decreased risk for kidney and bone side effects over time but it was too expensive.   Experiencing the following side effects:  none  Previous trials: Truvada - started around 9/2022  Last HIV ab/ag test: nonreactive 7/3/23  Renal function: stable  Hepatitis B: history of completing vaccine series, no serology on file  Hepatitis C: negative 3/2020    STI screening/monitoring:   Treponemal Ab: nonreactive 7/3/23  Gonorrhea/chlamydia (urine): negative 7/3/23     Hypertension:   Lisinopril 20 mg once daily - dose increase 11/30  Hydrochlorothiazide 12.5 mg once daily  Patient reports no current medication side effects.  Patient does not self-monitor blood pressure. Has nurse appointment in 1 month to recheck blood pressure. He's aware of ways to decrease blood pressure through lifestyle changes such as weight loss.   BP Readings from Last 3 Encounters:   11/30/23 (!) 155/111   09/11/23 (!) 148/90   09/09/22 126/83     Pulse Readings from Last 3 Encounters:   11/30/23 62   09/11/23 78   09/09/22 74       Hyperlipidemia:   atorvastatin 10 mg daily - started 11/30  Patient reports no current medication side effects.  The 10-year ASCVD risk score (Mason HOLLIDAY, et al., 2019) is: 9.8%    Values used to calculate the score:      Age: 53 years      Sex: Male      Is Non- : No      Diabetic: No      Tobacco smoker: No      Systolic Blood Pressure: 155 mmHg      Is BP treated: Yes      HDL Cholesterol: 43 mg/dL      Total Cholesterol: 205 mg/dL  Recent Labs   Lab Test 11/30/23  1051 08/29/22  1135   CHOL 205* 229*   HDL 43 47   * 142*   TRIG 138 202*     HSV:   Valacyclovir 2g  2 times daily for 1 day as needed for outbreak  No recent outbreaks - sometimes stress induced. This is effective.     GERD:   Omeprazole 20 mg once daily   Patient reports no current symptoms.   Patient feels that current regimen is effective.  He's trying to slowly taper off it. He skips a dose sometimes and doesn't have any symptoms.     Today's Vitals: There were no vitals taken for this visit.  ----------------    I spent 20 minutes with this patient today. All changes were made via collaborative practice agreement with Dr. Evans. A copy of the visit note was provided to the patient's provider(s).    A summary of these recommendations was sent via [employer]    Telemedicine Visit Details  Type of service:  Telephone visit  Start Time:  8 am  End Time: 8:20 AM     Medication Therapy Recommendations  On pre-exposure prophylaxis for HIV    Current Medication: emtricitabine-tenofovir AF (DESCOVY) 200-25 MG per tablet (Discontinued)   Rationale: Cannot afford medication product - Cost - Adherence   Recommendation: Provide Education   Status: Patient Agreed - Adherence/Education

## 2023-12-07 NOTE — PATIENT INSTRUCTIONS
"Recommendations from today's MTM visit:                                                      Call insurance to see if Descovy would be expensive every month or just for the first 1-2 months. Copay card would cover $7,200 per year.   Continue Truvada once daily if Descovy is too expensive - HIV ag/ab every 3 months  Omeprazole is a proton pump inhibitor. It can decrease how well your body absorbed calcium from your diet. This can contribute to weaker bones over time. To taper of omeprazole, you could try reducing to every other day for 1-2 months and use famotidine 20 mg as needed for breakthrough symptoms (this is available over the counter or you can ask for a prescription). Once you can go a few weeks without breakthrough symptoms, you can try stopping omeprazole and continuing famotidine as needed.     Follow-up: as needed     It was great speaking with you today.  I value your experience and would be very thankful for your time in providing feedback in our clinic survey. In the next few days, you may receive an email or text message from [employer] with a link to a survey related to your  clinical pharmacist.\"     To schedule another MTM appointment, please call the clinic directly or you may call the MTM scheduling line at [phone] or toll-free at [phone].     My Clinical Pharmacist's contact information:                                                      Please feel free to contact me with any questions or concerns you have.      Bibi Ferrer, PharmD, [facility]  Medication Therapy Management Pharmacist     "

## 2023-12-11 ENCOUNTER — LAB (OUTPATIENT)
Dept: LAB | Facility: CLINIC | Age: 53
End: 2023-12-11
Payer: COMMERCIAL

## 2023-12-11 DIAGNOSIS — Z79.899 ON PRE-EXPOSURE PROPHYLAXIS FOR HIV: ICD-10-CM

## 2023-12-11 PROCEDURE — 87591 N.GONORRHOEAE DNA AMP PROB: CPT

## 2023-12-11 PROCEDURE — 87491 CHLMYD TRACH DNA AMP PROBE: CPT

## 2023-12-11 PROCEDURE — 87389 HIV-1 AG W/HIV-1&-2 AB AG IA: CPT

## 2023-12-11 PROCEDURE — 86780 TREPONEMA PALLIDUM: CPT

## 2023-12-11 PROCEDURE — 36415 COLL VENOUS BLD VENIPUNCTURE: CPT

## 2024-01-03 ENCOUNTER — ALLIED HEALTH/NURSE VISIT (OUTPATIENT)
Dept: FAMILY MEDICINE | Facility: CLINIC | Age: 54
End: 2024-01-03
Payer: COMMERCIAL

## 2024-01-03 VITALS — HEART RATE: 72 BPM | DIASTOLIC BLOOD PRESSURE: 86 MMHG | SYSTOLIC BLOOD PRESSURE: 135 MMHG

## 2024-01-03 DIAGNOSIS — I10 BENIGN ESSENTIAL HYPERTENSION: Primary | ICD-10-CM

## 2024-01-03 PROCEDURE — 99207 PR NO CHARGE NURSE ONLY: CPT

## 2024-01-03 NOTE — PROGRESS NOTES
Follow Up Blood Pressure Check    Jl Sky is a 53 year old male recommended to follow up for blood pressure check by Samm Iraheta. Anihypertensive medications and adherence were verified: Yes.     Reason for visit: BP check     Medication change at last visit: none     Today's Vitals:   Vitals:    01/03/24 0905   BP: 135/86   BP Location: Left arm   Patient Position: Sitting   Cuff Size: Adult Regular   Pulse: 72           Lowest blood pressure today is  135/86  and they deny signs or symptoms of new onset: severe headache, fatigue, confusion, vision changes, chest pain, pounding in the chest, neck, ears, irregular heartbeat, difficulty breathing, and blood in the urine.  Please inform patient of his/her blood pressure today.  If they are asymptomatic, the patient is to continue current medications.  This message will be routed to their provider, and they will be notified if a change in medication is recommended.        Aracely Pierce MA    Current Outpatient Medications   Medication Sig Dispense Refill    atorvastatin (LIPITOR) 10 MG tablet Take 1 tablet (10 mg) by mouth daily 90 tablet 3    emtricitabine-tenofovir (TRUVADA) 200-300 MG per tablet Take 1 tablet by mouth daily 90 tablet 0    hydrochlorothiazide (HYDRODIURIL) 12.5 MG tablet Take 1 tablet (12.5 mg) by mouth daily 90 tablet 4    lisinopril (ZESTRIL) 20 MG tablet Take 1 tablet (20 mg) by mouth daily 90 tablet 3    omeprazole (PRILOSEC) 20 MG DR capsule Take 1 capsule (20 mg) by mouth daily before breakfast 90 capsule 4    valACYclovir (VALTREX) 1000 mg tablet [VALACYCLOVIR (VALTREX) 1000 MG TABLET] Take 2 tablets by mouth twice daily for 1 day as needed. 8 tablet 4

## 2024-01-09 ENCOUNTER — LAB (OUTPATIENT)
Dept: LAB | Facility: CLINIC | Age: 54
End: 2024-01-09
Payer: COMMERCIAL

## 2024-01-09 DIAGNOSIS — E78.2 MIXED HYPERLIPIDEMIA: ICD-10-CM

## 2024-01-09 LAB
ALT SERPL W P-5'-P-CCNC: 32 U/L (ref 0–70)
CHOLEST SERPL-MCNC: 156 MG/DL
FASTING STATUS PATIENT QL REPORTED: YES
HDLC SERPL-MCNC: 41 MG/DL
LDLC SERPL CALC-MCNC: 75 MG/DL
NONHDLC SERPL-MCNC: 115 MG/DL
TRIGL SERPL-MCNC: 202 MG/DL

## 2024-01-09 PROCEDURE — 36415 COLL VENOUS BLD VENIPUNCTURE: CPT

## 2024-01-09 PROCEDURE — 80061 LIPID PANEL: CPT

## 2024-01-09 PROCEDURE — 84460 ALANINE AMINO (ALT) (SGPT): CPT

## 2024-03-07 ENCOUNTER — LAB (OUTPATIENT)
Dept: LAB | Facility: CLINIC | Age: 54
End: 2024-03-07
Payer: COMMERCIAL

## 2024-03-07 DIAGNOSIS — Z79.899 ON PRE-EXPOSURE PROPHYLAXIS FOR HIV: ICD-10-CM

## 2024-03-07 PROCEDURE — 87491 CHLMYD TRACH DNA AMP PROBE: CPT

## 2024-03-07 PROCEDURE — 36415 COLL VENOUS BLD VENIPUNCTURE: CPT

## 2024-03-07 PROCEDURE — 86780 TREPONEMA PALLIDUM: CPT

## 2024-03-07 PROCEDURE — 87591 N.GONORRHOEAE DNA AMP PROB: CPT

## 2024-03-07 PROCEDURE — 87389 HIV-1 AG W/HIV-1&-2 AB AG IA: CPT

## 2024-04-02 DIAGNOSIS — Z79.899 ON PRE-EXPOSURE PROPHYLAXIS FOR HIV: ICD-10-CM

## 2024-04-02 RX ORDER — EMTRICITABINE AND TENOFOVIR DISOPROXIL FUMARATE 200; 300 MG/1; MG/1
1 TABLET, FILM COATED ORAL DAILY
Qty: 30 TABLET | Refills: 2 | Status: SHIPPED | OUTPATIENT
Start: 2024-04-02 | End: 2024-07-02

## 2024-04-02 NOTE — TELEPHONE ENCOUNTER
"    Requested Prescriptions   Pending Prescriptions Disp Refills    emtricitabine-tenofovir (TRUVADA) 200-300 MG per tablet [Pharmacy Med Name: EMTRICITABINE/TENOF 200-300MG TABS] 30 tablet 0     Sig: TAKE 1 TABLET BY MOUTH DAILY       Antiretroviral Agents Failed - 4/2/2024 10:03 AM        Failed - AST less than 55 on file in past 3 mos     Recent Labs   Lab Test 11/30/23  1051   AST 29             Failed - Recent (3 mo) or future (30 days) visit within the authorizing provider's specialty     Patient had office visit in the last 3 months or has a visit in the next 30 days with authorizing provider or within the authorizing provider's specialty.  See \"Patient Info\" tab in inbasket, or \"Choose Columns\" in Meds & Orders section of the refill encounter.                Failed - Refills for this medication group require provider approval        Failed - Serum Creatinine less than or equal to 1.4 on file in past 3 mos     Recent Labs   Lab Test 11/30/23  1051   CR 1.17                 Failed - Creatinine Clearance greater than 50 ml/min on file in past 3 mos.     No lab results found.          Failed - Serum HIV less than 200 on file in past 3 mos.     No lab results found.          Failed - CD4 count greater than 300 on file in past 3 mos.     No lab results found.          Passed - Medication is active on med list        Passed - ALT less than 90 on file in past 3 mos.     Recent Labs   Lab Test 01/09/24  0814   ALT 32             Passed - Patient is age 18 or older             "

## 2024-04-16 ENCOUNTER — NURSE TRIAGE (OUTPATIENT)
Dept: NURSING | Facility: CLINIC | Age: 54
End: 2024-04-16

## 2024-04-16 NOTE — TELEPHONE ENCOUNTER
"Pt reports \"difficulty urinating.\"  Sudden onset, approx 18 hours ago.  Persisted overnight.  \"Some pain there\" (mild).  \"Feels like wanting to urinate, but can't much.\"  \"Takes multiple trips.\"    No fever.  No faintness.  Feeling well otherwise.  \"Just sensation of wanting to urinate, however then can't really get a full stream going.\"  No abdominal pressure.  Able to empty bladder, however requires multiple attempts.    Pt agrees to prompt same-day clinical eval per triage.  Warm transferred to a  for an appointment now.  Prompt same-day OV scheduled.    Dior Marina RN  [facility] Nurse Advisor     Reason for Disposition   Pain or burning with passing urine (urination) and male   All other males with painful urination, or patient wants to be seen    Additional Information   Negative: Shock suspected (e.g., cold/pale/clammy skin, too weak to stand, low BP, rapid pulse)   Negative: Sounds like a life-threatening emergency to the triager   Negative: Followed a male genital area injury (penis, scrotum)   Negative: Pus (white, yellow) or bloody discharge from end of penis   Negative: Shock suspected (e.g., cold/pale/clammy skin, too weak to stand, low BP, rapid pulse)   Negative: Sounds like a life-threatening emergency to the triager   Negative: Unable to urinate (or only a few drops) and bladder feels very full   Negative: Swollen scrotum   Negative: Pain in scrotum or testicle that persists > 1 hour   Negative: Fever > 100.4 F (38.0 C)   Negative: Vomiting   Negative: Patient sounds very sick or weak to the triager   Negative: SEVERE pain with urination   Negative: Side (flank) or lower back pain present   Negative: Taking antibiotic > 24 hours for UTI and fever persists   Negative: Taking antibiotic > 3 days for UTI and painful urination not improved   Negative: Taking treatment > 3 days for STI (e.g., penile discharge from gonorrhea, chlamydia) and painful urination not improved    Protocols " used: Urinary Symptoms-A-OH, Urination Pain - Male-A-OH

## 2024-07-02 ENCOUNTER — LAB (OUTPATIENT)
Dept: LAB | Facility: CLINIC | Age: 54
End: 2024-07-02
Payer: COMMERCIAL

## 2024-07-02 DIAGNOSIS — Z79.899 ON PRE-EXPOSURE PROPHYLAXIS FOR HIV: ICD-10-CM

## 2024-07-02 PROCEDURE — 87591 N.GONORRHOEAE DNA AMP PROB: CPT

## 2024-07-02 PROCEDURE — 80048 BASIC METABOLIC PNL TOTAL CA: CPT

## 2024-07-02 PROCEDURE — 86780 TREPONEMA PALLIDUM: CPT

## 2024-07-02 PROCEDURE — 87491 CHLMYD TRACH DNA AMP PROBE: CPT

## 2024-07-02 PROCEDURE — 36415 COLL VENOUS BLD VENIPUNCTURE: CPT

## 2024-07-02 PROCEDURE — 87389 HIV-1 AG W/HIV-1&-2 AB AG IA: CPT

## 2024-07-02 RX ORDER — EMTRICITABINE AND TENOFOVIR DISOPROXIL FUMARATE 200; 300 MG/1; MG/1
1 TABLET, FILM COATED ORAL DAILY
Qty: 30 TABLET | Refills: 1 | Status: SHIPPED | OUTPATIENT
Start: 2024-07-02 | End: 2024-09-03

## 2024-07-03 LAB
ANION GAP SERPL CALCULATED.3IONS-SCNC: 15 MMOL/L (ref 7–15)
BUN SERPL-MCNC: 17.4 MG/DL (ref 6–20)
CALCIUM SERPL-MCNC: 9.1 MG/DL (ref 8.6–10)
CHLORIDE SERPL-SCNC: 105 MMOL/L (ref 98–107)
CREAT SERPL-MCNC: 1.14 MG/DL (ref 0.67–1.17)
DEPRECATED HCO3 PLAS-SCNC: 22 MMOL/L (ref 22–29)
EGFRCR SERPLBLD CKD-EPI 2021: 76 ML/MIN/1.73M2
GLUCOSE SERPL-MCNC: 98 MG/DL (ref 70–99)
POTASSIUM SERPL-SCNC: 4.4 MMOL/L (ref 3.4–5.3)
SODIUM SERPL-SCNC: 142 MMOL/L (ref 135–145)

## 2024-08-30 DIAGNOSIS — Z79.899 ON PRE-EXPOSURE PROPHYLAXIS FOR HIV: ICD-10-CM

## 2024-09-03 RX ORDER — EMTRICITABINE AND TENOFOVIR DISOPROXIL FUMARATE 200; 300 MG/1; MG/1
1 TABLET, FILM COATED ORAL DAILY
Qty: 30 TABLET | Refills: 0 | Status: SHIPPED | OUTPATIENT
Start: 2024-09-03

## 2024-09-16 ENCOUNTER — OFFICE VISIT (OUTPATIENT)
Dept: INFECTIOUS DISEASES | Facility: CLINIC | Age: 54
End: 2024-09-16
Payer: COMMERCIAL

## 2024-09-16 VITALS
BODY MASS INDEX: 30.67 KG/M2 | WEIGHT: 229.3 LBS | HEART RATE: 82 BPM | TEMPERATURE: 98.3 F | OXYGEN SATURATION: 98 % | SYSTOLIC BLOOD PRESSURE: 138 MMHG | DIASTOLIC BLOOD PRESSURE: 84 MMHG

## 2024-09-16 DIAGNOSIS — Z79.899 ON PRE-EXPOSURE PROPHYLAXIS FOR HIV: Primary | ICD-10-CM

## 2024-09-16 PROCEDURE — G2211 COMPLEX E/M VISIT ADD ON: HCPCS

## 2024-09-16 PROCEDURE — 99214 OFFICE O/P EST MOD 30 MIN: CPT

## 2024-09-16 RX ORDER — EMTRICITABINE AND TENOFOVIR DISOPROXIL FUMARATE 200; 300 MG/1; MG/1
1 TABLET, FILM COATED ORAL DAILY
Qty: 90 TABLET | Refills: 3 | Status: SHIPPED | OUTPATIENT
Start: 2024-09-16

## 2024-09-16 NOTE — PROGRESS NOTES
Patient is a 50-year-old gentleman referred by Samm Iraheta MD for HIV preexposure prophylaxis counseling        Patient is currently not in a monogamous relationship.  He was started on Truvada about a year ago, which he is tolerating well.    He was diagnosed with gonorrhea in January.  He was started on lisinopril in March.    We discussed the risks and benefits of true beta including that it protects against Truvada susceptible strains of HIV which constitutes most of the strain circulating, but not all of them.    We discussed that condoms are better protection against other sexually transmitted infections and Truvada does not protect against any other sexually transmitted infections.    There was also some question about anal Pap smears, which are indicated for men having sex with men.  I did make referral to colorectal surgery for those Pap smears.    Follow-up visit on August 30, 2021:    Patient states he is doing well.  He says he has not been sexually active in many months.  He has many questions about starting and stopping true beta for preexposure prophylaxis.    Follow-up visit on August 29, 2022:    Patient is doing generally well with his medications.  His blood pressure is a bit high.    Has not been getting his every 3-month HIV test or STD test.  Make sure those are done.    We did discuss cabotegravir, but as he is on daily medications for his hypertension, we could keep him on his Garima.    We also discussed monkey box.  He is not meet the current criteria for administration of the vaccine.  However, should the vaccine becomes generally available, I would recommend it for him.    He states he is not very active sexually, last encounter was a month ago.    Follow-up visit on September 11, 2023:    Doing well.  Been having some issues with his blood pressure being elevated.  He is on the same dose of hydrochlorothiazide and lisinopril at his last visit.      GENERAL: Healthy, alert and  no distress  EYES: Eyes grossly normal to inspection. No discharge or erythema, or obvious scleral/conjunctival abnormalities.  RESP: Normal respiratory pattern  NEURO: Cranial nerves grossly intact. Mentation and speech appropriate for age.  PSYCH: Mentation appears normal, affect normal/bright, judgement and insight intact, normal speech and appearance well-groomed    Impression: Sexually active man who has sex with other men.    Past infection with gonorrhea.  No current symptoms    Follow-up visit on September 16, 2024:    Patient is doing quite well.  He tells me he is not very sexually active and when he is the same person.  He feels that person is monogamous to him.    He continues on Truvada, generic equivalent.  He is not having any difficulties with it.  His renal function remains stable.    Did discuss switching over either to cabotegravir or lenacapavir should that become available.  He did try to get Descovy but it was cost prohibitive.      Recommendations: Truvada is still appropriate.  Over, we will see if we can get him Descovy instead of Truvada.    Orders Placed This Encounter   Procedures    HIV Antigen Antibody Combo     Standing Status:   Standing     Number of Occurrences:   4     Standing Expiration Date:   9/16/2025    Treponema Abs w Reflex to RPR and Titer     Standing Status:   Standing     Number of Occurrences:   4     Standing Expiration Date:   9/16/2025    Chlamydia trachomatis/Neisseria gonorrhoeae by PCR - Clinic Collect     Standing Status:   Standing     Number of Occurrences:   4     Standing Expiration Date:   9/16/2025       Follow-up in 1 year.    Norris Evans MD

## 2024-10-16 ENCOUNTER — LAB (OUTPATIENT)
Dept: LAB | Facility: CLINIC | Age: 54
End: 2024-10-16
Payer: COMMERCIAL

## 2024-10-16 DIAGNOSIS — Z79.899 ON PRE-EXPOSURE PROPHYLAXIS FOR HIV: ICD-10-CM

## 2024-10-16 LAB
C TRACH DNA SPEC QL PROBE+SIG AMP: NEGATIVE
HIV 1+2 AB+HIV1 P24 AG SERPL QL IA: NONREACTIVE
N GONORRHOEA DNA SPEC QL NAA+PROBE: NEGATIVE
T PALLIDUM AB SER QL: NONREACTIVE

## 2024-10-16 PROCEDURE — 87491 CHLMYD TRACH DNA AMP PROBE: CPT

## 2024-10-16 PROCEDURE — 36415 COLL VENOUS BLD VENIPUNCTURE: CPT

## 2024-10-16 PROCEDURE — 86780 TREPONEMA PALLIDUM: CPT

## 2024-10-16 PROCEDURE — 87389 HIV-1 AG W/HIV-1&-2 AB AG IA: CPT

## 2024-10-16 PROCEDURE — 87591 N.GONORRHOEAE DNA AMP PROB: CPT

## 2024-12-02 ENCOUNTER — MYC REFILL (OUTPATIENT)
Dept: FAMILY MEDICINE | Facility: CLINIC | Age: 54
End: 2024-12-02

## 2024-12-02 DIAGNOSIS — I10 BENIGN ESSENTIAL HYPERTENSION: ICD-10-CM

## 2024-12-02 DIAGNOSIS — K21.9 GASTROESOPHAGEAL REFLUX DISEASE, UNSPECIFIED WHETHER ESOPHAGITIS PRESENT: ICD-10-CM

## 2024-12-02 RX ORDER — LISINOPRIL 20 MG/1
20 TABLET ORAL DAILY
Qty: 90 TABLET | Refills: 0 | Status: SHIPPED | OUTPATIENT
Start: 2024-12-02

## 2024-12-02 RX ORDER — HYDROCHLOROTHIAZIDE 12.5 MG/1
12.5 TABLET ORAL DAILY
Qty: 90 TABLET | Refills: 0 | Status: SHIPPED | OUTPATIENT
Start: 2024-12-02

## 2024-12-03 DIAGNOSIS — E78.2 MIXED HYPERLIPIDEMIA: ICD-10-CM

## 2024-12-03 RX ORDER — ATORVASTATIN CALCIUM 10 MG/1
10 TABLET, FILM COATED ORAL DAILY
Qty: 30 TABLET | Refills: 0 | Status: SHIPPED | OUTPATIENT
Start: 2024-12-03

## 2024-12-03 NOTE — TELEPHONE ENCOUNTER
Reason for Call:  Medication or medication refill:    Do you use a [facility]?  Name of the pharmacy and phone number for the current request:  [id number] [place] [address] [facility] [address]    Name of the medication requested: lisinopriL (PRINIVIL,ZESTRIL) 10 MG tablet    Other request: pt states contacted pharmacy & they said they sent request to us but do not see in chart.  Pt will be out of medication as of today    Can we leave a detailed message on this number? YES    Phone number patient can be reached at: Cell number on file:    Telephone Information:   Mobile [phone]       Best Time: na    Call taken on 9/23/2021 at 9:06 AM by Sara Martinez     Female

## 2025-01-01 ENCOUNTER — MYC REFILL (OUTPATIENT)
Dept: FAMILY MEDICINE | Facility: CLINIC | Age: 55
End: 2025-01-01
Payer: COMMERCIAL

## 2025-01-01 DIAGNOSIS — E78.2 MIXED HYPERLIPIDEMIA: ICD-10-CM

## 2025-01-01 SDOH — HEALTH STABILITY: PHYSICAL HEALTH: ON AVERAGE, HOW MANY MINUTES DO YOU ENGAGE IN EXERCISE AT THIS LEVEL?: 30 MIN

## 2025-01-01 SDOH — HEALTH STABILITY: PHYSICAL HEALTH: ON AVERAGE, HOW MANY DAYS PER WEEK DO YOU ENGAGE IN MODERATE TO STRENUOUS EXERCISE (LIKE A BRISK WALK)?: 4 DAYS

## 2025-01-01 ASSESSMENT — SOCIAL DETERMINANTS OF HEALTH (SDOH): HOW OFTEN DO YOU GET TOGETHER WITH FRIENDS OR RELATIVES?: MORE THAN THREE TIMES A WEEK

## 2025-01-02 RX ORDER — ATORVASTATIN CALCIUM 10 MG/1
10 TABLET, FILM COATED ORAL DAILY
Qty: 30 TABLET | Refills: 0 | Status: SHIPPED | OUTPATIENT
Start: 2025-01-02

## 2025-01-07 ENCOUNTER — OFFICE VISIT (OUTPATIENT)
Dept: FAMILY MEDICINE | Facility: CLINIC | Age: 55
End: 2025-01-07
Attending: FAMILY MEDICINE
Payer: COMMERCIAL

## 2025-01-07 VITALS
OXYGEN SATURATION: 95 % | DIASTOLIC BLOOD PRESSURE: 106 MMHG | RESPIRATION RATE: 16 BRPM | SYSTOLIC BLOOD PRESSURE: 152 MMHG | WEIGHT: 230.6 LBS | HEIGHT: 72 IN | HEART RATE: 58 BPM | BODY MASS INDEX: 31.23 KG/M2

## 2025-01-07 DIAGNOSIS — B00.9 HERPES SIMPLEX VIRUS (HSV) INFECTION: ICD-10-CM

## 2025-01-07 DIAGNOSIS — I10 BENIGN ESSENTIAL HYPERTENSION: ICD-10-CM

## 2025-01-07 DIAGNOSIS — K21.9 GASTROESOPHAGEAL REFLUX DISEASE, UNSPECIFIED WHETHER ESOPHAGITIS PRESENT: ICD-10-CM

## 2025-01-07 DIAGNOSIS — Z79.899 ON PRE-EXPOSURE PROPHYLAXIS FOR HIV: ICD-10-CM

## 2025-01-07 DIAGNOSIS — Z00.00 ROUTINE GENERAL MEDICAL EXAMINATION AT A HEALTH CARE FACILITY: Primary | ICD-10-CM

## 2025-01-07 DIAGNOSIS — E78.2 MIXED HYPERLIPIDEMIA: ICD-10-CM

## 2025-01-07 LAB
ALBUMIN SERPL BCG-MCNC: 4.4 G/DL (ref 3.5–5.2)
ALP SERPL-CCNC: 96 U/L (ref 40–150)
ALT SERPL W P-5'-P-CCNC: 29 U/L (ref 0–70)
ANION GAP SERPL CALCULATED.3IONS-SCNC: 10 MMOL/L (ref 7–15)
AST SERPL W P-5'-P-CCNC: 23 U/L (ref 0–45)
BASOPHILS # BLD AUTO: 0 10E3/UL (ref 0–0.2)
BASOPHILS NFR BLD AUTO: 0 %
BILIRUB SERPL-MCNC: 0.5 MG/DL
BUN SERPL-MCNC: 19.4 MG/DL (ref 6–20)
CALCIUM SERPL-MCNC: 9.3 MG/DL (ref 8.8–10.4)
CHLORIDE SERPL-SCNC: 105 MMOL/L (ref 98–107)
CHOLEST SERPL-MCNC: 177 MG/DL
CREAT SERPL-MCNC: 1.13 MG/DL (ref 0.67–1.17)
EGFRCR SERPLBLD CKD-EPI 2021: 77 ML/MIN/1.73M2
EOSINOPHIL # BLD AUTO: 0.2 10E3/UL (ref 0–0.7)
EOSINOPHIL NFR BLD AUTO: 3 %
ERYTHROCYTE [DISTWIDTH] IN BLOOD BY AUTOMATED COUNT: 12.6 % (ref 10–15)
FASTING STATUS PATIENT QL REPORTED: YES
FASTING STATUS PATIENT QL REPORTED: YES
GLUCOSE SERPL-MCNC: 102 MG/DL (ref 70–99)
HCO3 SERPL-SCNC: 26 MMOL/L (ref 22–29)
HCT VFR BLD AUTO: 42.4 % (ref 40–53)
HDLC SERPL-MCNC: 46 MG/DL
HGB BLD-MCNC: 14.2 G/DL (ref 13.3–17.7)
HIV 1+2 AB+HIV1 P24 AG SERPL QL IA: NONREACTIVE
IMM GRANULOCYTES # BLD: 0 10E3/UL
IMM GRANULOCYTES NFR BLD: 0 %
LDLC SERPL CALC-MCNC: 92 MG/DL
LYMPHOCYTES # BLD AUTO: 1.9 10E3/UL (ref 0.8–5.3)
LYMPHOCYTES NFR BLD AUTO: 37 %
MCH RBC QN AUTO: 30.5 PG (ref 26.5–33)
MCHC RBC AUTO-ENTMCNC: 33.5 G/DL (ref 31.5–36.5)
MCV RBC AUTO: 91 FL (ref 78–100)
MONOCYTES # BLD AUTO: 0.4 10E3/UL (ref 0–1.3)
MONOCYTES NFR BLD AUTO: 7 %
NEUTROPHILS # BLD AUTO: 2.6 10E3/UL (ref 1.6–8.3)
NEUTROPHILS NFR BLD AUTO: 52 %
NONHDLC SERPL-MCNC: 131 MG/DL
PLATELET # BLD AUTO: 290 10E3/UL (ref 150–450)
POTASSIUM SERPL-SCNC: 4.5 MMOL/L (ref 3.4–5.3)
PROT SERPL-MCNC: 7.1 G/DL (ref 6.4–8.3)
RBC # BLD AUTO: 4.66 10E6/UL (ref 4.4–5.9)
SODIUM SERPL-SCNC: 141 MMOL/L (ref 135–145)
T PALLIDUM AB SER QL: NONREACTIVE
TRIGL SERPL-MCNC: 197 MG/DL
WBC # BLD AUTO: 5 10E3/UL (ref 4–11)

## 2025-01-07 PROCEDURE — 80061 LIPID PANEL: CPT | Performed by: FAMILY MEDICINE

## 2025-01-07 PROCEDURE — 90471 IMMUNIZATION ADMIN: CPT | Performed by: FAMILY MEDICINE

## 2025-01-07 PROCEDURE — 86780 TREPONEMA PALLIDUM: CPT | Performed by: FAMILY MEDICINE

## 2025-01-07 PROCEDURE — 87491 CHLMYD TRACH DNA AMP PROBE: CPT | Performed by: FAMILY MEDICINE

## 2025-01-07 PROCEDURE — G2211 COMPLEX E/M VISIT ADD ON: HCPCS | Performed by: FAMILY MEDICINE

## 2025-01-07 PROCEDURE — 99396 PREV VISIT EST AGE 40-64: CPT | Mod: 25 | Performed by: FAMILY MEDICINE

## 2025-01-07 PROCEDURE — 90677 PCV20 VACCINE IM: CPT | Performed by: FAMILY MEDICINE

## 2025-01-07 PROCEDURE — 87389 HIV-1 AG W/HIV-1&-2 AB AG IA: CPT | Performed by: FAMILY MEDICINE

## 2025-01-07 PROCEDURE — 36415 COLL VENOUS BLD VENIPUNCTURE: CPT | Performed by: FAMILY MEDICINE

## 2025-01-07 PROCEDURE — 85025 COMPLETE CBC W/AUTO DIFF WBC: CPT | Performed by: FAMILY MEDICINE

## 2025-01-07 PROCEDURE — 80053 COMPREHEN METABOLIC PANEL: CPT | Performed by: FAMILY MEDICINE

## 2025-01-07 PROCEDURE — 99214 OFFICE O/P EST MOD 30 MIN: CPT | Mod: 25 | Performed by: FAMILY MEDICINE

## 2025-01-07 PROCEDURE — 87591 N.GONORRHOEAE DNA AMP PROB: CPT | Performed by: FAMILY MEDICINE

## 2025-01-07 RX ORDER — ATORVASTATIN CALCIUM 10 MG/1
10 TABLET, FILM COATED ORAL DAILY
Qty: 90 TABLET | Refills: 3 | Status: SHIPPED | OUTPATIENT
Start: 2025-01-07

## 2025-01-07 RX ORDER — EMTRICITABINE AND TENOFOVIR DISOPROXIL FUMARATE 200; 300 MG/1; MG/1
1 TABLET, FILM COATED ORAL DAILY
Qty: 90 TABLET | Refills: 3 | Status: CANCELLED | OUTPATIENT
Start: 2025-01-07

## 2025-01-07 RX ORDER — HYDROCHLOROTHIAZIDE 12.5 MG/1
12.5 TABLET ORAL DAILY
Qty: 90 TABLET | Refills: 3 | Status: SHIPPED | OUTPATIENT
Start: 2025-01-07

## 2025-01-07 RX ORDER — VALACYCLOVIR HYDROCHLORIDE 1 G/1
TABLET, FILM COATED ORAL
Qty: 8 TABLET | Refills: 4 | Status: SHIPPED | OUTPATIENT
Start: 2025-01-07

## 2025-01-07 RX ORDER — LISINOPRIL 20 MG/1
20 TABLET ORAL DAILY
Qty: 90 TABLET | Refills: 3 | Status: SHIPPED | OUTPATIENT
Start: 2025-01-07

## 2025-01-07 NOTE — PATIENT INSTRUCTIONS
Patient Education   Preventive Care Advice   This is general advice given by our system to help you stay healthy. However, your care team may have specific advice just for you. Please talk to your care team about your preventive care needs.  Nutrition  Eat 5 or more servings of fruits and vegetables each day.  Try wheat bread, brown rice and whole grain pasta (instead of white bread, rice, and pasta).  Get enough calcium and vitamin D. Check the label on foods and aim for 100% of the RDA (recommended daily allowance).  Lifestyle  Exercise at least 150 minutes each week  (30 minutes a day, 5 days a week).  Do muscle strengthening activities 2 days a week. These help control your weight and prevent disease.  No smoking.  Wear sunscreen to prevent skin cancer.  Have a dental exam and cleaning every 6 months.  Yearly exams  See your health care team every year to talk about:  Any changes in your health.  Any medicines your care team has prescribed.  Preventive care, family planning, and ways to prevent chronic diseases.  Shots (vaccines)   HPV shots (up to age 26), if you've never had them before.  Hepatitis B shots (up to age 59), if you've never had them before.  COVID-19 shot: Get this shot when it's due.  Flu shot: Get a flu shot every year.  Tetanus shot: Get a tetanus shot every 10 years.  Pneumococcal, hepatitis A, and RSV shots: Ask your care team if you need these based on your risk.  Shingles shot (for age 50 and up)  General health tests  Diabetes screening:  Starting at age 35, Get screened for diabetes at least every 3 years.  If you are younger than age 35, ask your care team if you should be screened for diabetes.  Cholesterol test: At age 39, start having a cholesterol test every 5 years, or more often if advised.  Bone density scan (DEXA): At age 50, ask your care team if you should have this scan for osteoporosis (brittle bones).  Hepatitis C: Get tested at least once in your life.  STIs (sexually  transmitted infections)  Before age 24: Ask your care team if you should be screened for STIs.  After age 24: Get screened for STIs if you're at risk. You are at risk for STIs (including HIV) if:  You are sexually active with more than one person.  You don't use condoms every time.  You or a partner was diagnosed with a sexually transmitted infection.  If you are at risk for HIV, ask about PrEP medicine to prevent HIV.  Get tested for HIV at least once in your life, whether you are at risk for HIV or not.  Cancer screening tests  Cervical cancer screening: If you have a cervix, begin getting regular cervical cancer screening tests starting at age 21.  Breast cancer scan (mammogram): If you've ever had breasts, begin having regular mammograms starting at age 40. This is a scan to check for breast cancer.  Colon cancer screening: It is important to start screening for colon cancer at age 45.  Have a colonoscopy test every 10 years (or more often if you're at risk) Or, ask your provider about stool tests like a FIT test every year or Cologuard test every 3 years.  To learn more about your testing options, visit:   .  For help making a decision, visit:   https://bit.ly/sc78615.  Prostate cancer screening test: If you have a prostate, ask your care team if a prostate cancer screening test (PSA) at age 55 is right for you.  Lung cancer screening: If you are a current or former smoker ages 50 to 80, ask your care team if ongoing lung cancer screenings are right for you.  For informational purposes only. Not to replace the advice of your health care provider. Copyright   2023 [employer]. All rights reserved. Clinically reviewed by the [facility] Transitions Program. [employer] 349987 - REV 01/24.

## 2025-01-07 NOTE — PROGRESS NOTES
Preventive Care Visit  [facility]  Samm Iraheta MD, Family Medicine  Jan 7, 2025      Assessment & Plan     ICD-10-CM    1. Routine general medical examination at a health care facility  Z00.00 CBC with platelets and differential      2. Mixed hyperlipidemia  E78.2 Lipid panel reflex to direct LDL Fasting     atorvastatin (LIPITOR) 10 MG tablet      3. Herpes simplex virus (HSV) infection  B00.9 valACYclovir (VALTREX) 1000 mg tablet      4. Gastroesophageal reflux disease, unspecified whether esophagitis present  K21.9 omeprazole (PRILOSEC) 20 MG DR capsule      5. Benign essential hypertension  I10 lisinopril (ZESTRIL) 20 MG tablet     hydroCHLOROthiazide 12.5 MG tablet     Comprehensive metabolic panel (BMP + Alb, Alk Phos, ALT, AST, Total. Bili, TP)      6. On pre-exposure prophylaxis for HIV  Z79.899         Patient presents today for annual physical.  Following issues addressed  1: Mixed hyperlipidemia: Atorvastatin 10 mg.  Continue current medication.  Check fasting labs  2: Essential hypertension: Blood pressure is high today.  It had normalized after adding hydrochlorothiazide in the past.  Come for a nurse visit for repeat check in 1 month's time.  If persistently high then we will increase the dose of hydrochlorothiazide to 25 mg every day.  Patient verbalizes understanding.  3: GERD: Stable on Prilosec.  Continue medication  4: HSV infection: This is intermittent flareups and he takes Valtrex as needed.  5: HIV prophylaxis.  Following with infectious diseases and currently on Truvada.        BMI  Estimated body mass index is 31.27 kg/m  as calculated from the following:    Height as of this encounter: 1.829 m (6').    Weight as of this encounter: 104.6 kg (230 lb 9.6 oz).   Weight management plan: Discussed healthy diet and exercise guidelines    Counseling  Appropriate preventive services were addressed with this patient via screening, questionnaire, or discussion as  appropriate for fall prevention, nutrition, physical activity, Tobacco-use cessation, social engagement, weight loss and cognition.  Checklist reviewing preventive services available has been given to the patient.  Reviewed patient's diet, addressing concerns and/or questions.       MEDICATIONS:  Continue current medications without change  Work on weight loss  Regular exercise  See Patient Instructions    The longitudinal plan of care for the diagnosis(es)/condition(s) as documented were addressed during this visit. Due to the added complexity in care, I will continue to support Jl in the subsequent management and with ongoing continuity of care.      Subjective   Jl is a 54 year old, presenting for the following:  Physical (Pt is fasting today, no concerns today. )        1/7/2025     8:14 AM   Additional Questions   Roomed by Sol Noble CMA          HPI        Health Care Directive  Patient does not have a Health Care Directive: Discussed advance care planning with patient; information given to patient to review.      1/1/2025   General Health   How would you rate your overall physical health? Good   Feel stress (tense, anxious, or unable to sleep) Not at all         1/1/2025   Nutrition   Three or more servings of calcium each day? (!) NO   Diet: Regular (no restrictions)    Low fat/cholesterol   How many servings of fruit and vegetables per day? (!) 0-1   How many sweetened beverages each day? 0-1       Multiple values from one day are sorted in reverse-chronological order         1/1/2025   Exercise   Days per week of moderate/strenous exercise 4 days   Average minutes spent exercising at this level 30 min         1/1/2025   Social Factors   Frequency of gathering with friends or relatives More than three times a week   Worry food won't last until get money to buy more No   Food not last or not have enough money for food? No   Do you have housing? (Housing is defined as stable permanent housing and does  not include staying ouside in a car, in a tent, in an abandoned building, in an overnight shelter, or couch-surfing.) Yes   Are you worried about losing your housing? No   Lack of transportation? No   Unable to get utilities (heat,electricity)? No         1/1/2025   Fall Risk   Fallen 2 or more times in the past year? No   Trouble with walking or balance? No          1/1/2025   Dental   Dentist two times every year? Yes         1/1/2025   TB Screening   Were you born outside of the [place]? No         Today's PHQ-2 Score:       1/7/2025     8:03 AM   PHQ-2 ( 1999 [employer])   Q1: Little interest or pleasure in doing things 0   Q2: Feeling down, depressed or hopeless 0   PHQ-2 Score 0    Q1: Little interest or pleasure in doing things Not at all   Q2: Feeling down, depressed or hopeless Not at all   PHQ-2 Score 0       Patient-reported           1/1/2025   Substance Use   Alcohol more than 3/day or more than 7/wk No   Do you use any other substances recreationally? No     Social History     Tobacco Use    Smoking status: Never    Smokeless tobacco: Never   Substance Use Topics    Alcohol use: Yes     Comment: Alcoholic Drinks/day: 3 days per week, 2 drinks at a time    Drug use: Never           1/1/2025   STI Screening   New sexual partner(s) since last STI/HIV test? No   Last PSA:   Prostate Specific Antigen Screen   Date Value Ref Range Status   11/30/2023 2.17 0.00 - 3.50 ng/mL Final   08/03/2021 1.58 0.00 - 3.50 ug/L Final     ASCVD Risk   The 10-year ASCVD risk score (Mason HOLLIDAY, et al., 2019) is: 7.9%    Values used to calculate the score:      Age: 54 years      Sex: Male      Is Non- : No      Diabetic: No      Tobacco smoker: No      Systolic Blood Pressure: 152 mmHg      Is BP treated: Yes      HDL Cholesterol: 41 mg/dL      Total Cholesterol: 156 mg/dL           Reviewed and updated as needed this visit by Provider   Tobacco  Allergies  Meds  Problems  Med Hx  Surg Hx  Fam Hx             Past Medical History:   Diagnosis Date    Hypertension 2019     History reviewed. No pertinent surgical history.  BP Readings from Last 3 Encounters:   01/07/25 (!) 152/106   09/16/24 138/84   01/03/24 135/86    Wt Readings from Last 3 Encounters:   01/07/25 104.6 kg (230 lb 9.6 oz)   09/16/24 104 kg (229 lb 4.8 oz)   11/30/23 104.9 kg (231 lb 3.2 oz)                  Patient Active Problem List   Diagnosis    Overweight    Hemorrhoids    Herpes Simplex Type I    Esophageal Reflux    Allergic Rhinitis    On pre-exposure prophylaxis for HIV    Increased prostate specific antigen (PSA) velocity     History reviewed. No pertinent surgical history.    Social History     Tobacco Use    Smoking status: Never    Smokeless tobacco: Never   Substance Use Topics    Alcohol use: Yes     Comment: Alcoholic Drinks/day: 3 days per week, 2 drinks at a time     Family History   Problem Relation Age of Onset    Hypertension Mother     Pulmonary Embolism Father     Diabetes Type 2  Father         500 lbs    Diabetes Father     Transient ischemic attack Sister     Coronary Artery Disease Paternal Grandfather          Current Outpatient Medications   Medication Sig Dispense Refill    atorvastatin (LIPITOR) 10 MG tablet Take 1 tablet (10 mg) by mouth daily. 90 tablet 3    emtricitabine-tenofovir (TRUVADA) 200-300 MG per tablet Take 1 tablet by mouth daily. 90 tablet 3    hydroCHLOROthiazide 12.5 MG tablet Take 1 tablet (12.5 mg) by mouth daily. 90 tablet 3    lisinopril (ZESTRIL) 20 MG tablet Take 1 tablet (20 mg) by mouth daily. 90 tablet 3    omeprazole (PRILOSEC) 20 MG DR capsule Take 1 capsule (20 mg) by mouth daily. 90 capsule 3    valACYclovir (VALTREX) 1000 mg tablet [VALACYCLOVIR (VALTREX) 1000 MG TABLET] Take 2 tablets by mouth twice daily for 1 day as needed. 8 tablet 4         Review of Systems  Constitutional, neuro, ENT, endocrine, pulmonary, cardiac, gastrointestinal, genitourinary, musculoskeletal, integument  and psychiatric systems are negative, except as otherwise noted.     Objective    Exam  BP (!) 152/106   Pulse 58   Resp 16   Ht 1.829 m (6')   Wt 104.6 kg (230 lb 9.6 oz)   SpO2 95%   BMI 31.27 kg/m     Estimated body mass index is 31.27 kg/m  as calculated from the following:    Height as of this encounter: 1.829 m (6').    Weight as of this encounter: 104.6 kg (230 lb 9.6 oz).    Physical Exam  GENERAL: alert and no distress  EYES: Eyes grossly normal to inspection, PERRL and conjunctivae and sclerae normal  NECK: no adenopathy, no asymmetry, masses, or scars  RESP: lungs clear to auscultation - no rales, rhonchi or wheezes  CV: regular rate and rhythm, normal S1 S2, no S3 or S4, no murmur, click or rub, no peripheral edema  ABDOMEN: soft, nontender, no hepatosplenomegaly, no masses and bowel sounds normal  MS: no gross musculoskeletal defects noted, no edema        Signed Electronically by: Samm Iraheta MD

## 2025-01-08 LAB
C TRACH DNA SPEC QL PROBE+SIG AMP: NEGATIVE
N GONORRHOEA DNA SPEC QL NAA+PROBE: NEGATIVE

## 2025-02-10 ENCOUNTER — ALLIED HEALTH/NURSE VISIT (OUTPATIENT)
Dept: FAMILY MEDICINE | Facility: CLINIC | Age: 55
End: 2025-02-10
Payer: COMMERCIAL

## 2025-02-10 VITALS — SYSTOLIC BLOOD PRESSURE: 153 MMHG | DIASTOLIC BLOOD PRESSURE: 100 MMHG | HEART RATE: 66 BPM

## 2025-02-10 DIAGNOSIS — I10 BENIGN ESSENTIAL HYPERTENSION: Primary | ICD-10-CM

## 2025-02-10 PROCEDURE — 99207 PR NO CHARGE NURSE ONLY: CPT

## 2025-02-10 RX ORDER — HYDROCHLOROTHIAZIDE 25 MG/1
25 TABLET ORAL DAILY
Qty: 90 TABLET | Refills: 3 | Status: SHIPPED | OUTPATIENT
Start: 2025-02-10

## 2025-02-10 NOTE — PROGRESS NOTES
Follow Up Blood Pressure Check    Jl Sky is a 54 year old male recommended to follow up for blood pressure check by Samm Iraheta. Anihypertensive medications and adherence were verified: Yes.     Reason for visit: BP check following elevated BP at last visit 1/7/25    Medication change at last visit: None    Today's Vitals:   Vitals:    02/10/25 0901 02/10/25 0906   BP: (!) 155/104 (!) 153/100   BP Location: Left arm Left arm   Patient Position: Sitting Sitting   Cuff Size: Adult Regular Adult Regular   Pulse: 67 66       Home blood pressure readings brought in today:   None today.    Lowest blood pressure today is <180/<110 and they deny signs or symptoms of new onset: severe headache, fatigue, confusion, vision changes, chest pain, pounding in the chest, neck, ears, irregular heartbeat, difficulty breathing, and blood in the urine.    Christian Hendrickson RN    Current Outpatient Medications   Medication Sig Dispense Refill    atorvastatin (LIPITOR) 10 MG tablet Take 1 tablet (10 mg) by mouth daily. 90 tablet 3    emtricitabine-tenofovir (TRUVADA) 200-300 MG per tablet Take 1 tablet by mouth daily. 90 tablet 3    hydroCHLOROthiazide 12.5 MG tablet Take 1 tablet (12.5 mg) by mouth daily. 90 tablet 3    lisinopril (ZESTRIL) 20 MG tablet Take 1 tablet (20 mg) by mouth daily. 90 tablet 3    omeprazole (PRILOSEC) 20 MG DR capsule Take 1 capsule (20 mg) by mouth daily. 90 capsule 3    valACYclovir (VALTREX) 1000 mg tablet [VALACYCLOVIR (VALTREX) 1000 MG TABLET] Take 2 tablets by mouth twice daily for 1 day as needed. 8 tablet 4

## 2025-03-12 ENCOUNTER — ALLIED HEALTH/NURSE VISIT (OUTPATIENT)
Dept: FAMILY MEDICINE | Facility: CLINIC | Age: 55
End: 2025-03-12
Payer: COMMERCIAL

## 2025-03-12 ENCOUNTER — TELEPHONE (OUTPATIENT)
Dept: FAMILY MEDICINE | Facility: CLINIC | Age: 55
End: 2025-03-12

## 2025-03-12 VITALS — HEART RATE: 63 BPM | SYSTOLIC BLOOD PRESSURE: 131 MMHG | DIASTOLIC BLOOD PRESSURE: 95 MMHG

## 2025-03-12 DIAGNOSIS — I10 BENIGN ESSENTIAL HYPERTENSION: Primary | ICD-10-CM

## 2025-03-12 PROCEDURE — 3080F DIAST BP >= 90 MM HG: CPT

## 2025-03-12 PROCEDURE — 99207 PR NO CHARGE NURSE ONLY: CPT

## 2025-03-12 PROCEDURE — 3075F SYST BP GE 130 - 139MM HG: CPT

## 2025-03-12 NOTE — PROGRESS NOTES
Follow Up Blood Pressure Check    Jl Sky is a 55 year old male recommended to follow up for blood pressure check by Samm Iraheta. Anihypertensive medications and adherence were verified: Yes.     Reason for visit: Bp following dose adjustment    Medication change at last visit: hydrochlorothiazide increased from 12.5mg daily->25mg daily ~2/10/25    Today's Vitals:   Vitals:    03/12/25 0855 03/12/25 0902   BP: (!) 150/102 (!) 131/95   BP Location: Left arm Left arm   Patient Position: Sitting Sitting   Cuff Size: Adult Regular Adult Regular   Pulse: 65 63       Home blood pressure readings brought in today:   None today    Lowest blood pressure today is less than 140/90 and they deny signs or symptoms of new onset: severe headache, fatigue, confusion, vision changes, chest pain, pounding in the chest, neck, ears, irregular heartbeat, difficulty breathing, and blood in the urine    Christian Hendrickson, RN    Current Outpatient Medications   Medication Sig Dispense Refill    atorvastatin (LIPITOR) 10 MG tablet Take 1 tablet (10 mg) by mouth daily. 90 tablet 3    emtricitabine-tenofovir (TRUVADA) 200-300 MG per tablet Take 1 tablet by mouth daily. 90 tablet 3    hydroCHLOROthiazide (HYDRODIURIL) 25 MG tablet Take 1 tablet (25 mg) by mouth daily. 90 tablet 3    lisinopril (ZESTRIL) 20 MG tablet Take 1 tablet (20 mg) by mouth daily. 90 tablet 3    omeprazole (PRILOSEC) 20 MG DR capsule Take 1 capsule (20 mg) by mouth daily. 90 capsule 3    valACYclovir (VALTREX) 1000 mg tablet [VALACYCLOVIR (VALTREX) 1000 MG TABLET] Take 2 tablets by mouth twice daily for 1 day as needed. 8 tablet 4

## 2025-03-12 NOTE — TELEPHONE ENCOUNTER
Called patient and lmtcb, please relay note from Dr. WOLF and assist with scheduling.    Regarding BP check 3/12/25:    Author: Samm Iraheta MD Service: -- Author Type: Physician   Filed: 3/12/2025  9:31 AM Encounter Date: 3/12/2025 Status: Signed   : Samm Iraheta MD (Physician)     Please inform patient that blood pressure remains under suboptimal control.  I would advise lifestyle modification including cutting down salt in the diet, daily exercise and following with primary care physician in 3 months time.     MD Christian Christensen, MARCIA     [facility]

## 2025-03-12 NOTE — PROGRESS NOTES
Please inform patient that blood pressure remains under suboptimal control.  I would advise lifestyle modification including cutting down salt in the diet, daily exercise and following with primary care physician in 3 months time.    Samm Iraheta MD

## 2025-04-16 ENCOUNTER — LAB (OUTPATIENT)
Dept: LAB | Facility: CLINIC | Age: 55
End: 2025-04-16
Payer: COMMERCIAL

## 2025-04-16 DIAGNOSIS — Z79.899 ON PRE-EXPOSURE PROPHYLAXIS FOR HIV: ICD-10-CM

## 2025-04-16 LAB
C TRACH DNA SPEC QL PROBE+SIG AMP: NEGATIVE
HIV 1+2 AB+HIV1 P24 AG SERPL QL IA: NONREACTIVE
N GONORRHOEA DNA SPEC QL NAA+PROBE: NEGATIVE
SPECIMEN TYPE: NORMAL
T PALLIDUM AB SER QL: NONREACTIVE

## 2025-04-16 PROCEDURE — 36415 COLL VENOUS BLD VENIPUNCTURE: CPT

## 2025-04-16 PROCEDURE — 87491 CHLMYD TRACH DNA AMP PROBE: CPT

## 2025-04-16 PROCEDURE — 87591 N.GONORRHOEAE DNA AMP PROB: CPT

## 2025-04-16 PROCEDURE — 87389 HIV-1 AG W/HIV-1&-2 AB AG IA: CPT

## 2025-04-16 PROCEDURE — 86780 TREPONEMA PALLIDUM: CPT

## 2025-06-12 ENCOUNTER — OFFICE VISIT (OUTPATIENT)
Dept: FAMILY MEDICINE | Facility: CLINIC | Age: 55
End: 2025-06-12
Payer: COMMERCIAL

## 2025-06-12 VITALS
SYSTOLIC BLOOD PRESSURE: 131 MMHG | OXYGEN SATURATION: 98 % | DIASTOLIC BLOOD PRESSURE: 99 MMHG | TEMPERATURE: 98.7 F | WEIGHT: 219.8 LBS | HEART RATE: 66 BPM | BODY MASS INDEX: 29.77 KG/M2 | HEIGHT: 72 IN | RESPIRATION RATE: 16 BRPM

## 2025-06-12 DIAGNOSIS — Z79.899 ON PRE-EXPOSURE PROPHYLAXIS FOR HIV: ICD-10-CM

## 2025-06-12 DIAGNOSIS — I10 BENIGN ESSENTIAL HYPERTENSION: Primary | ICD-10-CM

## 2025-06-12 RX ORDER — AMLODIPINE BESYLATE 5 MG/1
5 TABLET ORAL DAILY
Qty: 90 TABLET | Refills: 1 | Status: SHIPPED | OUTPATIENT
Start: 2025-06-12

## 2025-06-12 NOTE — PROGRESS NOTES
Assessment & Plan     ICD-10-CM    1. Benign essential hypertension  I10 amLODIPine (NORVASC) 5 MG tablet      2. On pre-exposure prophylaxis for HIV  Z79.899         Patient here for follow-up of blood pressure.  This does remain under suboptimal control.  He is on lisinopril 20 mg and HCTZ 25 mg which was titrated recently.  He is asymptomatic.  Add amlodipine 5 mg.  Patient has a known preexposure prophylaxis with Truvada.  Reviewed side effect and blood pressure is not one of them.  Mom does have hypertension.  He will follow-up with nurse visit in 1 month's time and then we can increase amlodipine to 10 mg.     As I was reviewing Truvada side effect profile, decreased bone density is mentioned.  Patient will bring up with infectious disease specialist at his next visit if he needs a DEXA scan.    The longitudinal plan of care for the diagnosis(es)/condition(s) as documented were addressed during this visit. Due to the added complexity in care, I will continue to support Jl in the subsequent management and with ongoing continuity of care.        Follow-up       Subjective   Jl is a 55 year old, presenting for the following health issues:  Hypertension (Follow up on blood pressure)        6/12/2025     8:28 AM   Additional Questions   Roomed by Sol Noble CMA     History of Present Illness       Hypertension: He presents for follow up of hypertension.  He does not check blood pressure  regularly outside of the clinic. Outside blood pressures have been over 140/90. He follows a low salt diet.     He eats 0-1 servings of fruits and vegetables daily.He consumes 1 sweetened beverage(s) daily.He exercises with enough effort to increase his heart rate 20 to 29 minutes per day.  He exercises with enough effort to increase his heart rate 3 or less days per week.   He is taking medications regularly.        Patient Active Problem List   Diagnosis    Overweight    Hemorrhoids    Herpes Simplex Type I    Esophageal  Reflux    Allergic Rhinitis    On pre-exposure prophylaxis for HIV    Increased prostate specific antigen (PSA) velocity     Current Outpatient Medications   Medication Sig Dispense Refill    amLODIPine (NORVASC) 5 MG tablet Take 1 tablet (5 mg) by mouth daily. 90 tablet 1    atorvastatin (LIPITOR) 10 MG tablet Take 1 tablet (10 mg) by mouth daily. 90 tablet 3    emtricitabine-tenofovir (TRUVADA) 200-300 MG per tablet Take 1 tablet by mouth daily. 90 tablet 3    hydroCHLOROthiazide (HYDRODIURIL) 25 MG tablet Take 1 tablet (25 mg) by mouth daily. 90 tablet 3    lisinopril (ZESTRIL) 20 MG tablet Take 1 tablet (20 mg) by mouth daily. 90 tablet 3    omeprazole (PRILOSEC) 20 MG DR capsule Take 1 capsule (20 mg) by mouth daily. 90 capsule 3    valACYclovir (VALTREX) 1000 mg tablet [VALACYCLOVIR (VALTREX) 1000 MG TABLET] Take 2 tablets by mouth twice daily for 1 day as needed. 8 tablet 4     No current facility-administered medications for this visit.           Review of Systems  Constitutional, neuro, ENT, endocrine, pulmonary, cardiac, gastrointestinal, genitourinary, musculoskeletal, integument and psychiatric systems are negative, except as otherwise noted.      Objective    BP (!) 145/100 (BP Location: Left arm, Patient Position: Sitting, Cuff Size: Adult Large)   Pulse 66   Temp 98.7  F (37.1  C) (Oral)   Resp 16   Ht 1.829 m (6')   Wt 99.7 kg (219 lb 12.8 oz)   SpO2 98%   BMI 29.81 kg/m    Body mass index is 29.81 kg/m .  Physical Exam   GENERAL: alert and no distress  NECK: no adenopathy, no asymmetry, masses, or scars  RESP: lungs clear to auscultation - no rales, rhonchi or wheezes  CV: regular rate and rhythm, normal S1 S2, no S3 or S4, no murmur, click or rub, no peripheral edema    Lab on 04/16/2025   Component Date Value Ref Range Status    Treponema Antibody Total 04/16/2025 Nonreactive  Nonreactive Final    Chlamydia Trachomatis 04/16/2025 Negative  Negative Final    Negative for C. trachomatis rRNA  by transcription mediated amplification.   A negative result by transcription mediated amplification does not preclude the presence of infection because results are dependent on proper and adequate collection, absence of inhibitors and sufficient rRNA to be detected.    Neisseria gonorrhoeae 04/16/2025 Negative  Negative Final    Negative for N. gonorrhoeae rRNA by transcription mediated amplification. A negative result by transcription mediated amplification does not preclude the presence of C. trachomatis infection because results are dependent on proper and adequate collection, absence of inhibitors and sufficient rRNA to be detected.    CTNG Specimen Source 04/16/2025 Urine, Voided   Final    HIV Antigen Antibody Combo 04/16/2025 Nonreactive  Nonreactive Final    Negative HIV-1 p24 antigen and HIV-1/2 antibody screening test results usually indicate the absence of HIV-1 and HIV-2 infection. However, such negative results do not rule-out acute HIV infection.  If acute HIV-1 or HIV-2 infection is suspected, detection of HIV-1 or HIV-2 RNA  is recommended. This result is obtained using the Roche Elecsys HIV Duo method on the joana [id number] immunoassay analyzer.           Signed Electronically by: Samm Iraheta MD

## 2025-06-19 ENCOUNTER — PATIENT OUTREACH (OUTPATIENT)
Dept: FAMILY MEDICINE | Facility: CLINIC | Age: 55
End: 2025-06-19
Payer: COMMERCIAL

## 2025-06-19 NOTE — TELEPHONE ENCOUNTER
Patient Quality Outreach    Patient is due for the following:   Hypertension -  BP check  Physical Preventive Adult Physical      Topic Date Due    COVID-19 Vaccine (6 - 2024-25 season) 09/01/2024       Action(s) Taken:   Patient has upcoming appointment, these items will be addressed at that time.    Type of outreach:    Chart review performed, no outreach needed.    Questions for provider review:    None         Elva Morales MA  Chart routed to None.

## 2025-06-30 ENCOUNTER — TELEPHONE (OUTPATIENT)
Dept: FAMILY MEDICINE | Facility: CLINIC | Age: 55
End: 2025-06-30
Payer: COMMERCIAL

## 2025-06-30 NOTE — TELEPHONE ENCOUNTER
FYI - Status Update    Who is Calling: patient    Update: Patient's mother is on 5 mg Norvac & 25 mg atenenol. She's been on these meds for most of her life, since the birth of patient's youngest sister in '77. Just for your information.    Does caller want a call/response back: No

## 2025-07-14 ENCOUNTER — ALLIED HEALTH/NURSE VISIT (OUTPATIENT)
Dept: FAMILY MEDICINE | Facility: CLINIC | Age: 55
End: 2025-07-14
Payer: COMMERCIAL

## 2025-07-14 VITALS — SYSTOLIC BLOOD PRESSURE: 119 MMHG | DIASTOLIC BLOOD PRESSURE: 81 MMHG | HEART RATE: 60 BPM

## 2025-07-14 DIAGNOSIS — I10 BENIGN ESSENTIAL HYPERTENSION: ICD-10-CM

## 2025-07-14 DIAGNOSIS — Z01.30 BLOOD PRESSURE CHECK: Primary | ICD-10-CM

## 2025-07-14 PROCEDURE — 3074F SYST BP LT 130 MM HG: CPT

## 2025-07-14 PROCEDURE — 99207 PR NO CHARGE NURSE ONLY: CPT

## 2025-07-14 PROCEDURE — 3079F DIAST BP 80-89 MM HG: CPT

## 2025-07-14 RX ORDER — LISINOPRIL AND HYDROCHLOROTHIAZIDE 20; 25 MG/1; MG/1
1 TABLET ORAL DAILY
Qty: 90 TABLET | Refills: 3 | Status: SHIPPED | OUTPATIENT
Start: 2025-07-14

## 2025-07-14 RX ORDER — AMLODIPINE BESYLATE 5 MG/1
5 TABLET ORAL DAILY
Qty: 90 TABLET | Refills: 3 | Status: SHIPPED | OUTPATIENT
Start: 2025-07-14

## 2025-07-14 NOTE — PROGRESS NOTES
Follow Up Blood Pressure Check    Jl Sky is a 55 year old male recommended to follow up for blood pressure check by Samm Iraheta. Anihypertensive medications and adherence were verified: Yes.     Reason for visit: Blood pressure check    Medication change at last visit: Added amlodipine 5mg    Today's Vitals:   Vitals:    07/14/25 0931 07/14/25 0947   BP: 131/87 119/81   BP Location: Left arm Left arm   Patient Position: Sitting Sitting   Cuff Size: Adult Regular Adult Regular   Pulse: 63 60       Home blood pressure readings brought in today:   Patient does not check his blood pressure at home.    Lowest blood pressure today is less than 140/90 and they deny signs or symptoms of new onset: none.  Please inform patient of his/her blood pressure today.  If they are asymptomatic, the patient is to continue current medications.  This message will be routed to their provider, and they will be notified if a change in medication is recommended.    Klaudia Cisneros RN    Current Outpatient Medications   Medication Sig Dispense Refill    amLODIPine (NORVASC) 5 MG tablet Take 1 tablet (5 mg) by mouth daily. 90 tablet 1    hydroCHLOROthiazide (HYDRODIURIL) 25 MG tablet Take 1 tablet (25 mg) by mouth daily. 90 tablet 3    lisinopril (ZESTRIL) 20 MG tablet Take 1 tablet (20 mg) by mouth daily. 90 tablet 3    atorvastatin (LIPITOR) 10 MG tablet Take 1 tablet (10 mg) by mouth daily. 90 tablet 3    emtricitabine-tenofovir (TRUVADA) 200-300 MG per tablet Take 1 tablet by mouth daily. 90 tablet 3    omeprazole (PRILOSEC) 20 MG DR capsule Take 1 capsule (20 mg) by mouth daily. 90 capsule 3    valACYclovir (VALTREX) 1000 mg tablet [VALACYCLOVIR (VALTREX) 1000 MG TABLET] Take 2 tablets by mouth twice daily for 1 day as needed. 8 tablet 4

## 2025-07-14 NOTE — Clinical Note
Patient states he has about 1 week supply left of amlodipine 5mg tablets.  He is wondering if he will be switching to a combination pill or if he should continue taking current medications.  Please advise.

## 2025-08-12 ENCOUNTER — LAB (OUTPATIENT)
Dept: LAB | Facility: CLINIC | Age: 55
End: 2025-08-12
Payer: COMMERCIAL

## 2025-08-12 DIAGNOSIS — Z79.899 ON PRE-EXPOSURE PROPHYLAXIS FOR HIV: ICD-10-CM

## 2025-08-12 PROCEDURE — 87491 CHLMYD TRACH DNA AMP PROBE: CPT

## 2025-08-12 PROCEDURE — 86780 TREPONEMA PALLIDUM: CPT

## 2025-08-12 PROCEDURE — 36415 COLL VENOUS BLD VENIPUNCTURE: CPT

## 2025-08-12 PROCEDURE — 87389 HIV-1 AG W/HIV-1&-2 AB AG IA: CPT

## 2025-08-12 PROCEDURE — 87591 N.GONORRHOEAE DNA AMP PROB: CPT
